# Patient Record
Sex: FEMALE | Race: WHITE | NOT HISPANIC OR LATINO | Employment: OTHER | URBAN - METROPOLITAN AREA
[De-identification: names, ages, dates, MRNs, and addresses within clinical notes are randomized per-mention and may not be internally consistent; named-entity substitution may affect disease eponyms.]

---

## 2023-12-07 ENCOUNTER — OFFICE VISIT (OUTPATIENT)
Dept: ORTHOPEDIC SURGERY | Facility: CLINIC | Age: 78
End: 2023-12-07
Payer: MEDICARE

## 2023-12-07 VITALS — HEIGHT: 62 IN | WEIGHT: 143 LBS | BODY MASS INDEX: 26.31 KG/M2

## 2023-12-07 DIAGNOSIS — M25.562 CHRONIC PAIN OF LEFT KNEE: Primary | ICD-10-CM

## 2023-12-07 DIAGNOSIS — G89.29 CHRONIC PAIN OF LEFT KNEE: Primary | ICD-10-CM

## 2023-12-07 DIAGNOSIS — M17.12 ARTHRITIS OF LEFT KNEE: ICD-10-CM

## 2023-12-07 PROCEDURE — 20610 DRAIN/INJ JOINT/BURSA W/O US: CPT | Performed by: ORTHOPAEDIC SURGERY

## 2023-12-07 PROCEDURE — 99213 OFFICE O/P EST LOW 20 MIN: CPT | Performed by: ORTHOPAEDIC SURGERY

## 2023-12-07 RX ORDER — IBUPROFEN 800 MG/1
800 TABLET ORAL 3 TIMES DAILY
Qty: 90 TABLET | Refills: 3 | Status: SHIPPED | OUTPATIENT
Start: 2023-12-07 | End: 2024-01-06

## 2023-12-07 RX ORDER — LIDOCAINE HYDROCHLORIDE 20 MG/ML
2 INJECTION, SOLUTION INFILTRATION; PERINEURAL
Status: COMPLETED | OUTPATIENT
Start: 2023-12-07 | End: 2023-12-07

## 2023-12-07 RX ORDER — TRIAMCINOLONE ACETONIDE 40 MG/ML
40 INJECTION, SUSPENSION INTRA-ARTICULAR; INTRAMUSCULAR
Status: COMPLETED | OUTPATIENT
Start: 2023-12-07 | End: 2023-12-07

## 2023-12-07 RX ORDER — HYDROXYZINE HYDROCHLORIDE 10 MG/1
10 TABLET, FILM COATED ORAL NIGHTLY
COMMUNITY

## 2023-12-07 RX ADMIN — TRIAMCINOLONE ACETONIDE 40 MG: 40 INJECTION, SUSPENSION INTRA-ARTICULAR; INTRAMUSCULAR at 11:43

## 2023-12-07 RX ADMIN — LIDOCAINE HYDROCHLORIDE 2 ML: 20 INJECTION, SOLUTION INFILTRATION; PERINEURAL at 11:43

## 2023-12-07 ASSESSMENT — ENCOUNTER SYMPTOMS: KNEE DEFORMITY: 1

## 2023-12-07 NOTE — PROGRESS NOTES
Subjective    Patient ID: Vicki Chaney is a 78 y.o. female.    Chief Complaint: Follow-up of the Left Knee     Last Surgery: No surgery found  Last Surgery Date: No surgery found    Left Knee       She comes in for follow-up of her left knee arthritis.  She chooses to treat this conservatively.  She typically gets adequate relief with a Kenalog injection.  Her last injection was well over 4 months ago.  She denies any new trauma.  She denies any fevers or chills.    Objective   Ortho Exam  Patient is in no acute distress.  Exam of her left knee reveals she does have a moderate effusion.  There is no warmth erythema.  There is no evidence of infection.  She is tender to palpation over the medial joint line.  She has no tenderness laterally.  There was mild crepitus with active flexion and extension of her knee.  Active range of motion was 0 to greater than 124 degrees of flexion.  Knee was stable to varus and valgus stress testing.    Assessment/Plan   Encounter Diagnoses:  Chronic pain of left knee    Arthritis of left knee    Patient has left knee pain from known left knee arthritis.  She wished to undergo another Kenalog injection as this provides enough relief.    L Inj/Asp: L knee on 12/7/2023 11:43 AM  Indications: pain  Details: 22 G needle, anteromedial approach  Medications: 40 mg triamcinolone acetonide 40 mg/mL; 2 mL lidocaine 20 mg/mL (2 %)  Outcome: tolerated well, no immediate complications  Procedure, treatment alternatives, risks and benefits explained, specific risks discussed. Consent was given by the patient. Immediately prior to procedure a time out was called to verify the correct patient, procedure, equipment, support staff and site/side marked as required. Patient was prepped and draped in the usual sterile fashion.       Patient was informed it may take a week for the injection have an effect.  She otherwise will follow-up as her symptoms dictate.

## 2024-02-29 ENCOUNTER — APPOINTMENT (OUTPATIENT)
Dept: ORTHOPEDIC SURGERY | Facility: CLINIC | Age: 79
End: 2024-02-29
Payer: MEDICARE

## 2024-03-04 ENCOUNTER — OFFICE VISIT (OUTPATIENT)
Dept: ORTHOPEDIC SURGERY | Facility: CLINIC | Age: 79
End: 2024-03-04
Payer: MEDICARE

## 2024-03-04 ENCOUNTER — HOSPITAL ENCOUNTER (OUTPATIENT)
Dept: RADIOLOGY | Facility: CLINIC | Age: 79
Discharge: HOME | End: 2024-03-04
Payer: MEDICARE

## 2024-03-04 VITALS — HEIGHT: 61 IN | WEIGHT: 144 LBS | BODY MASS INDEX: 27.19 KG/M2

## 2024-03-04 DIAGNOSIS — G89.29 BILATERAL CHRONIC KNEE PAIN: ICD-10-CM

## 2024-03-04 DIAGNOSIS — M25.561 BILATERAL CHRONIC KNEE PAIN: ICD-10-CM

## 2024-03-04 DIAGNOSIS — M25.562 BILATERAL CHRONIC KNEE PAIN: ICD-10-CM

## 2024-03-04 DIAGNOSIS — M17.0 PRIMARY OSTEOARTHRITIS OF BOTH KNEES: Primary | ICD-10-CM

## 2024-03-04 PROCEDURE — 1036F TOBACCO NON-USER: CPT | Performed by: ORTHOPAEDIC SURGERY

## 2024-03-04 PROCEDURE — 73562 X-RAY EXAM OF KNEE 3: CPT | Mod: 50

## 2024-03-04 PROCEDURE — 1159F MED LIST DOCD IN RCRD: CPT | Performed by: ORTHOPAEDIC SURGERY

## 2024-03-04 PROCEDURE — 73562 X-RAY EXAM OF KNEE 3: CPT | Mod: BILATERAL PROCEDURE | Performed by: RADIOLOGY

## 2024-03-04 PROCEDURE — 20610 DRAIN/INJ JOINT/BURSA W/O US: CPT | Performed by: ORTHOPAEDIC SURGERY

## 2024-03-04 PROCEDURE — 99213 OFFICE O/P EST LOW 20 MIN: CPT | Performed by: ORTHOPAEDIC SURGERY

## 2024-03-04 RX ORDER — TRIAMCINOLONE ACETONIDE 40 MG/ML
10 INJECTION, SUSPENSION INTRA-ARTICULAR; INTRAMUSCULAR
Status: COMPLETED | OUTPATIENT
Start: 2024-03-04 | End: 2024-03-04

## 2024-03-04 RX ORDER — SERTRALINE HYDROCHLORIDE 100 MG/1
TABLET, FILM COATED ORAL
COMMUNITY

## 2024-03-04 RX ORDER — LIDOCAINE HYDROCHLORIDE 20 MG/ML
2 INJECTION, SOLUTION INFILTRATION; PERINEURAL
Status: COMPLETED | OUTPATIENT
Start: 2024-03-04 | End: 2024-03-04

## 2024-03-04 RX ADMIN — TRIAMCINOLONE ACETONIDE 10 MG: 40 INJECTION, SUSPENSION INTRA-ARTICULAR; INTRAMUSCULAR at 11:59

## 2024-03-04 RX ADMIN — LIDOCAINE HYDROCHLORIDE 2 ML: 20 INJECTION, SOLUTION INFILTRATION; PERINEURAL at 11:59

## 2024-03-04 ASSESSMENT — ENCOUNTER SYMPTOMS
KNEE DEFORMITY: 1
KNEE SWELLING: 1

## 2024-03-04 NOTE — PROGRESS NOTES
Subjective    Patient ID: Vicki Chaney is a 78 y.o. female.    Chief Complaint: Follow-up of the Left Knee and Follow-up of the Right Knee     Last Surgery: No surgery found  Last Surgery Date: No surgery found    Left Knee     Right Knee       Patient comes in for follow-up of her bilateral knee pain.  He states her left knee is more symptomatic than the right.  She typically treats her arthritic symptoms with Kenalog injections.  Is been over 4 months since an injection.  However it has also been longer than that since a set of x-rays.    Objective   Ortho Exam  Patient is in no acute distress.  She does walk using a cane to assist with ambulation.  Her left knee has moderate swelling.  There is no warmth or erythema.  There is crepitus at the patellofemoral joint with flexion and extension.  She is tender more along the medial joint line and the lateral joint line.  The knee is stable to varus and valgus stress testing.  Range of motion is from 0 to greater than 120 degrees of flexion.    Exam of the patient's right knee reveals her skin envelope is intact.  Her range of motion is from 0 to greater than 120 degrees of flexion.  Knee is stable to varus and valgus stress testing.  There is also crepitus with flexion and extension of the knee.    X-rays of both knees were personally reviewed today.  She has moderate joint space narrowing medially.  In the patellofemoral compartments she has bone-on-bone in both knees.    Assessment/Plan   Encounter Diagnoses:  Primary osteoarthritis of both knees    Bilateral chronic knee pain    Orders Placed This Encounter    XR knee 3 views bilateral     Patient has bilateral knee pain secondary to arthritis in both knees.  We discussed treatment options and she elected to undergo Kenalog injections in both knees today.    L Inj/Asp: bilateral knee on 3/4/2024 11:59 AM  Indications: pain  Details: 22 G needle, anteromedial approach  Medications (Right): 10 mg triamcinolone  acetonide 40 mg/mL; 2 mL lidocaine 20 mg/mL (2 %)  Medications (Left): 10 mg triamcinolone acetonide 40 mg/mL; 2 mL lidocaine 20 mg/mL (2 %)  Outcome: tolerated well, no immediate complications  Procedure, treatment alternatives, risks and benefits explained, specific risks discussed. Consent was given by the patient. Immediately prior to procedure a time out was called to verify the correct patient, procedure, equipment, support staff and site/side marked as required. Patient was prepped and draped in the usual sterile fashion.         Patient was again informed that takes about a week for the Kenalog injections have an effect.  She otherwise will follow-up as her symptoms dictate.

## 2024-11-13 ENCOUNTER — OFFICE VISIT (OUTPATIENT)
Dept: ORTHOPEDIC SURGERY | Facility: CLINIC | Age: 79
End: 2024-11-13
Payer: MEDICARE

## 2024-11-13 VITALS — HEIGHT: 61 IN | WEIGHT: 148 LBS | BODY MASS INDEX: 27.94 KG/M2

## 2024-11-13 DIAGNOSIS — M17.0 PRIMARY OSTEOARTHRITIS OF BOTH KNEES: ICD-10-CM

## 2024-11-13 DIAGNOSIS — M25.561 BILATERAL CHRONIC KNEE PAIN: Primary | ICD-10-CM

## 2024-11-13 DIAGNOSIS — M25.562 BILATERAL CHRONIC KNEE PAIN: Primary | ICD-10-CM

## 2024-11-13 DIAGNOSIS — G89.29 BILATERAL CHRONIC KNEE PAIN: Primary | ICD-10-CM

## 2024-11-13 PROCEDURE — 1160F RVW MEDS BY RX/DR IN RCRD: CPT | Performed by: ORTHOPAEDIC SURGERY

## 2024-11-13 PROCEDURE — 99213 OFFICE O/P EST LOW 20 MIN: CPT | Performed by: ORTHOPAEDIC SURGERY

## 2024-11-13 PROCEDURE — 1159F MED LIST DOCD IN RCRD: CPT | Performed by: ORTHOPAEDIC SURGERY

## 2024-11-13 PROCEDURE — 2500000004 HC RX 250 GENERAL PHARMACY W/ HCPCS (ALT 636 FOR OP/ED): Performed by: ORTHOPAEDIC SURGERY

## 2024-11-13 PROCEDURE — 20610 DRAIN/INJ JOINT/BURSA W/O US: CPT | Mod: 50 | Performed by: ORTHOPAEDIC SURGERY

## 2024-11-13 PROCEDURE — 1036F TOBACCO NON-USER: CPT | Performed by: ORTHOPAEDIC SURGERY

## 2024-11-13 RX ORDER — TRIAMCINOLONE ACETONIDE 40 MG/ML
40 INJECTION, SUSPENSION INTRA-ARTICULAR; INTRAMUSCULAR
Status: COMPLETED | OUTPATIENT
Start: 2024-11-13 | End: 2024-11-13

## 2024-11-13 RX ORDER — LIDOCAINE HYDROCHLORIDE 20 MG/ML
2 INJECTION, SOLUTION INFILTRATION; PERINEURAL
Status: COMPLETED | OUTPATIENT
Start: 2024-11-13 | End: 2024-11-13

## 2024-11-13 ASSESSMENT — ENCOUNTER SYMPTOMS
KNEE SWELLING: 1
KNEE DEFORMITY: 1

## 2024-11-13 NOTE — PROGRESS NOTES
Subjective    Patient ID: Vicki Chaney is a 79 y.o. female.    Chief Complaint: Follow-up of the Left Knee and Follow-up of the Right Knee     Last Surgery: No surgery found  Last Surgery Date: No surgery found    Left Knee     Right Knee       Patient returns today for follow-up of her bilateral knee arthritis.  She has chosen to treat this conservatively.  Her last set of injections was about 8 months ago.  She states her right side is more painful than her left.  She does use a cane to assist with ambulation not just because of the knee pain but because of balance problems.    Objective   Ortho Exam  The patient is in no acute distress.  She is using a cane in her right hand to assist with ambulation.  Exam of her left knee reveals she has a moderate effusion.  There is no warmth erythema.  There was crepitus with flexion and extension of the knee.  She has tenderness more so over the medial joint line.  Active range of motion from 0 to greater than 120 degrees of flexion.    Exam of the patient's right knee reveals there is just a mild effusion.  There is no warmth erythema.  There is crepitus with flexion and extension of the knee.  The knee is stable to varus and valgus stress testing.  She is tender along the medial joint line.  Range of motion is from 0 to greater than 120 degrees of flexion.  Image Results:  XR knee 3 views bilateral  Narrative: Interpreted By:  Geovanna Garcia,   STUDY:  Bilateral knees, three views each      INDICATION:  Signs/Symptoms:BILATERAL KNEE PAIN.      COMPARISON:  None.      ACCESSION NUMBER(S):  [id number]      ORDERING CLINICIAN:  FLORIN DELA CRUZ      FINDINGS:  No acute fracture or malalignment of the bilateral knees.  Moderate bilateral patellofemoral compartment osteoarthrosis with  joint space loss and osteophytes. Mild bilateral medial compartment  osteoarthrosis as well. No significant joint effusion bilaterally.  Bilateral femoral artery vascular calcifications.       Impression: 1. Moderate patellofemoral and mild medial compartment osteoarthrosis  of the bilateral knees.      MACRO:  None.      Signed by: Geovanna Garcia 3/5/2024 6:05 PM  Dictation workstation:   [id number]      Assessment/Plan   Encounter Diagnoses:  Bilateral chronic knee pain    Primary osteoarthritis of both knees    Patient has known bilateral knee arthritis.  She elected again to treat these conservatively with Kenalog injections in both knees.    L Inj/Asp: bilateral knee on 11/13/2024 1:18 PM  Indications: pain  Details: 22 G needle, anteromedial approach  Medications (Right): 40 mg triamcinolone acetonide 40 mg/mL; 2 mL lidocaine 20 mg/mL (2 %)  Medications (Left): 40 mg triamcinolone acetonide 40 mg/mL; 2 mL lidocaine 20 mg/mL (2 %)  Outcome: tolerated well, no immediate complications  Procedure, treatment alternatives, risks and benefits explained, specific risks discussed. Consent was given by the patient. Immediately prior to procedure a time out was called to verify the correct patient, procedure, equipment, support staff and site/side marked as required. Patient was prepped and draped in the usual sterile fashion.       Patient was informed that we will take approximately a week for the injections have an effect.  She would use the assistive device as needed.  She will follow-up as her symptoms dictate.

## 2025-04-03 ENCOUNTER — APPOINTMENT (OUTPATIENT)
Dept: ORTHOPEDIC SURGERY | Facility: CLINIC | Age: 80
End: 2025-04-03
Payer: MEDICARE

## 2025-04-03 DIAGNOSIS — M25.562 BILATERAL CHRONIC KNEE PAIN: Primary | ICD-10-CM

## 2025-04-03 DIAGNOSIS — G89.29 BILATERAL CHRONIC KNEE PAIN: Primary | ICD-10-CM

## 2025-04-03 DIAGNOSIS — M25.561 BILATERAL CHRONIC KNEE PAIN: Primary | ICD-10-CM

## 2025-04-03 DIAGNOSIS — M17.0 PRIMARY OSTEOARTHRITIS OF BOTH KNEES: ICD-10-CM

## 2025-04-03 PROCEDURE — 1159F MED LIST DOCD IN RCRD: CPT | Performed by: ORTHOPAEDIC SURGERY

## 2025-04-03 PROCEDURE — 20610 DRAIN/INJ JOINT/BURSA W/O US: CPT | Performed by: ORTHOPAEDIC SURGERY

## 2025-04-03 PROCEDURE — 99213 OFFICE O/P EST LOW 20 MIN: CPT | Performed by: ORTHOPAEDIC SURGERY

## 2025-04-03 PROCEDURE — 1036F TOBACCO NON-USER: CPT | Performed by: ORTHOPAEDIC SURGERY

## 2025-04-03 RX ORDER — TRIAMCINOLONE ACETONIDE 40 MG/ML
40 INJECTION, SUSPENSION INTRA-ARTICULAR; INTRAMUSCULAR
Status: COMPLETED | OUTPATIENT
Start: 2025-04-03 | End: 2025-04-03

## 2025-04-03 RX ORDER — LIDOCAINE HYDROCHLORIDE 20 MG/ML
2 INJECTION, SOLUTION INFILTRATION; PERINEURAL
Status: COMPLETED | OUTPATIENT
Start: 2025-04-03 | End: 2025-04-03

## 2025-04-03 RX ORDER — IBUPROFEN 800 MG/1
800 TABLET ORAL 3 TIMES DAILY
Qty: 90 TABLET | Refills: 5 | Status: SHIPPED | OUTPATIENT
Start: 2025-04-03 | End: 2025-09-30

## 2025-04-03 RX ADMIN — TRIAMCINOLONE ACETONIDE 40 MG: 40 INJECTION, SUSPENSION INTRA-ARTICULAR; INTRAMUSCULAR at 11:58

## 2025-04-03 RX ADMIN — LIDOCAINE HYDROCHLORIDE 2 ML: 20 INJECTION, SOLUTION INFILTRATION; PERINEURAL at 11:58

## 2025-04-03 ASSESSMENT — ENCOUNTER SYMPTOMS
KNEE DEFORMITY: 1
KNEE SWELLING: 1

## 2025-04-03 NOTE — PROGRESS NOTES
Subjective    Patient ID: Vicki Chaney is a 79 y.o. female.    Chief Complaint: Follow-up of the Left Knee (FUV BL KNEE INJ LAST DONE 11/13/24) and Follow-up of the Right Knee     Last Surgery: No surgery found  Last Surgery Date: No surgery found    Left Knee     Right Knee       The patient returns today for follow-up of her bilateral knee arthritis.  She gets adequate relief with Kenalog injections.  Her last set of injections were nearly 5 months ago.  Today her left side is slightly more symptomatic than the right side.  She denies any new trauma.    Objective   Ortho Exam  The patient is in no acute distress.  Exam of her left knee reveals she has a moderate effusion.  There is no warmth erythema.  She is tender over the medial joint line as opposed to the lateral joint line.  There was no crepitus with flexion or extension of the knee.  Active range of motion was from 0 to greater than 120 degrees of flexion.  The knee was stable to varus and valgus stress testing.    Exam of the patient's right knee reveals there is a mild effusion.  There is no warmth erythema.  She was mildly tender over the medial joint line.  The knee was able to varus and valgus stress testing.  Active range of motion was from 0 to greater than 120 degrees of flexion.      Image Results:  XR knee 3 views bilateral  Narrative: Interpreted By:  Geovanna Garcia,   STUDY:  Bilateral knees, three views each      INDICATION:  Signs/Symptoms:BILATERAL KNEE PAIN.      COMPARISON:  None.      ACCESSION NUMBER(S):  [id number]      ORDERING CLINICIAN:  FLORIN DELA CRUZ      FINDINGS:  No acute fracture or malalignment of the bilateral knees.  Moderate bilateral patellofemoral compartment osteoarthrosis with  joint space loss and osteophytes. Mild bilateral medial compartment  osteoarthrosis as well. No significant joint effusion bilaterally.  Bilateral femoral artery vascular calcifications.      Impression: 1. Moderate patellofemoral and  mild medial compartment osteoarthrosis  of the bilateral knees.      MACRO:  None.      Signed by: Geovanna Garcia 3/5/2024 6:05 PM  Dictation workstation:   [id number]      Assessment/Plan   Encounter Diagnoses:  Bilateral chronic knee pain    Primary osteoarthritis of both knees    Orders Placed This Encounter    ibuprofen 800 mg tablet     Patient has known bilateral knee arthritis.  She wishes to undergo another set of Kenalog injections today in the office.    L Inj/Asp: bilateral knee on 4/3/2025 11:58 AM  Indications: pain and joint swelling  Details: 22 G needle, anteromedial approach  Medications (Right): 40 mg triamcinolone acetonide 40 mg/mL; 2 mL lidocaine 20 mg/mL (2 %)  Medications (Left): 40 mg triamcinolone acetonide 40 mg/mL; 2 mL lidocaine 20 mg/mL (2 %)  Procedure, treatment alternatives, risks and benefits explained, specific risks discussed. Consent was given by the patient. Immediately prior to procedure a time out was called to verify the correct patient, procedure, equipment, support staff and site/side marked as required. Patient was prepped and draped in the usual sterile fashion.         Patient was informed it takes about a week for the injection to have an effect.  She otherwise will follow-up as her symptoms dictate.

## 2025-04-29 ENCOUNTER — HOSPITAL ENCOUNTER (OUTPATIENT)
Dept: RADIOLOGY | Facility: CLINIC | Age: 80
Discharge: HOME | End: 2025-04-29
Payer: MEDICARE

## 2025-04-29 ENCOUNTER — APPOINTMENT (OUTPATIENT)
Dept: ORTHOPEDIC SURGERY | Facility: CLINIC | Age: 80
End: 2025-04-29
Payer: MEDICARE

## 2025-04-29 DIAGNOSIS — M47.812 CERVICAL SPONDYLOSIS: ICD-10-CM

## 2025-04-29 DIAGNOSIS — M47.812 CERVICAL SPONDYLOSIS: Primary | ICD-10-CM

## 2025-04-29 DIAGNOSIS — M54.12 CERVICAL RADICULOPATHY: ICD-10-CM

## 2025-04-29 DIAGNOSIS — Q76.415 CONGENITAL KYPHOSIS OF THORACOLUMBAR REGION: ICD-10-CM

## 2025-04-29 PROCEDURE — 1160F RVW MEDS BY RX/DR IN RCRD: CPT | Performed by: STUDENT IN AN ORGANIZED HEALTH CARE EDUCATION/TRAINING PROGRAM

## 2025-04-29 PROCEDURE — 1159F MED LIST DOCD IN RCRD: CPT | Performed by: STUDENT IN AN ORGANIZED HEALTH CARE EDUCATION/TRAINING PROGRAM

## 2025-04-29 PROCEDURE — 99204 OFFICE O/P NEW MOD 45 MIN: CPT | Performed by: STUDENT IN AN ORGANIZED HEALTH CARE EDUCATION/TRAINING PROGRAM

## 2025-04-29 PROCEDURE — 72050 X-RAY EXAM NECK SPINE 4/5VWS: CPT | Performed by: RADIOLOGY

## 2025-04-29 PROCEDURE — 72050 X-RAY EXAM NECK SPINE 4/5VWS: CPT

## 2025-04-29 SDOH — SOCIAL STABILITY: SOCIAL NETWORK: SOCIAL ACTIVITY:: 10

## 2025-04-29 NOTE — PROGRESS NOTES
New Consult/New Patient Note    4/29/2025   No ref. provider found    Assessment:      PLAN:  1)  Imaging/Diagnostic Studies: [ ]   2)  Therapy/Rehabilitation: [ ]   3)  Pharmacological Management: Can continue ibuprofen as prescribed, and takes Tylenol 650mg.   4)  Spine/Surgical Interventions: [ ]   5)  Alternative Treatments: May consider alternative treatment options in the future including manipulation (chiropractor versus osteopathic) and/or acupuncture if patient does not obtain optimal relief with initial treatment plan.  6)  Consultations:  None at this time  7)  Follow -up: 4-6 weeks or PRN if symptoms worsen/do not improve.   8)  Future treatment considerations: [ ]     Patient advised of the difference between hurt and harm and advised to continue with all normal activities and exercises. Patient verbalized understanding of the above plan and was happy with the care provided.      The above clinical summary has been dictated with voice recognition software. It has not been proofread for grammatical errors, typographical mistakes, or other semantic inconsistencies.    Thank you for visiting our office today. It was our pleasure to take part in your healthcare.     Do not hesitate to call with any questions regarding your plan of care after leaving at [phone]    To clinicians, thank you very much for this kind referral. It is a privilege to partner with you in the care of your patients. My office would be delighted to assist you with any further consultations or with questions regarding the plan of care outlined. Do not hesitate to call the office or contact me directly.     Sincerely,    Azul Ramirez MD  Fellow [facility], PM&R  [facility] [employer]  [facility] [place]    **Neck pain dull and throbbing to left side  *cervical pain  Patient angeline  **Note Jessica 2023      ***Home exercises   PT, XR     Pain with extension and looking to left cervical  spine.         Patient reports that she was doing physical therapy in 2023 for gait imbalance. She ambulates with a cane. Patient describes radiating pain, she states a tingling near her left scapula. Patient reports this changes with position.       Vicki Chaney   is a 79 y.o. female who presents with   Location: Left neck pain that goes down to the scapula  Radiation: to left scapulothoracic area  Quality: sharp and dull; pulsating      current 4-5/10,  at its worst 10 /10  Exacerbated by certain positions in sitting   Relieved by changing body positions   Onset, traumatic event:  no  Has tried:  aspercreme, icyhot, salonpas    Valsalva sign is no  Grocery cart sign is no  Smoker:  no  Does not wake them at night    Patient denies bowel/bladder incontinence, denies fever, denies unintentional weight loss, denies clumsiness of hands, feet, or dropping things.  Denies any constitutional or myelopathic symptomatology.      PREVIOUS TREATMENTS  IN THE LAST SIX MONTHS     Active conservative therapy  in the last six months (see below)              1. Physical therapy:   no                                                                                  2. Home exercise program after PT:                                                      3. A physician supervised home exercise program (HEP):                 4. Chiropractic Care:    no                                                                  Passive conservative therapy  in the last six months (see below)              1. NSAIDS: Ibuprofen                                                                                                        2. Prescription pain medication:                                                              3. Acupuncture:                                                                                             4. Tens unit:      Assistive Devices:     Work status: retired       ROS: Other than listed in HPI, PMHX below, and  intake paperwork including a 30 point patient-recorded review of symptoms which was personally reviewed and inclusive of no history of unintentional weight loss, change in appetite, significant malaise, fevers, chills, or change in bowel/bladder, shortness of breath, or chest pain.    I have confirmed and edited as necessary Past Medical, Past Surgical, Family, Social History and ROS as obtained by others. These were also obtained on new patient forms.      PHYSICAL EXAM:   GENERAL APPEARANCE:  Well nourished, well developed, and no apparent distress.  NEURO PSYCH: Patient oriented to person, place, Mood pleasant. Benign affect.  MUSCULOSKELETAL and NEUROLOGICAL       VISUAL INSPECTION          CERVICAL: WNL          THORACIC: WNL           LUMBAR: WNL  SPINE ROM:   CERVICAL ROM: [ ]   LUMBAR ROM: [ ]       PALPATION:           SPINOUS PROCESS: [ ]            PARASPINALS: [ ]   FACET LOADING: [ ]   MUSCLE BULK: Normal and symmetrical in the upper & lower extremities.  MUSCLE TONE: Normal  MOTOR: 5/5 in all muscle groups tested in bilateral upper and lower extremities   SENSORY: Normal sensory exam to light touch  GAIT: Normal.  Able to go up and heels and toes with no sig. weakness.  No sig. balance deficit appreciated  REFLEXES: +2 to bilateral U/L extremities  LONG TRACT SIGNS: No clonus, Neg Hoffmans.  STRAIGHT LEG TEST: [ ]   PERIPHERAL JOINT ROM:   HIP ROM: Full bilaterally  PANCHITO/Thigh Thrust/Compression/Juliana Finger:  Negative bilaterally   Hip Exam including thigh thrust and LOG ROLL: Negative bilaterally  SHOULDER ROM: Full bilaterally   SPURLING'S TEST: Negative  BAKODY'S SIGN:  No sig. pain with overhead activity    DATA REVIEW:   The below imaging studies were personally reviewed and discussed with the patient.    Medical Decision Making:  The above note constitutes a Moderate to High level of medical decision making based on past data and imaging review, new and chronic symptoms with exacerbation,  change in weakness or sensation, new imaging and diagnostic studies ordered, discussion of potential interventional or surgical treatment options, acute or chronic pain that may pose a threat to bodily function.    Medical History[1]    Medication Documentation Review Audit       Reviewed by Sonal Baptiste RN (Registered Nurse) on 04/29/25 at 1343      Medication Order Taking? Sig Documenting Provider Last Dose Status   hydrOXYzine HCL (Atarax) 10 mg tablet [id number] No Take 1 tablet (10 mg) by mouth once daily at bedtime. Historical Provider, MD Taking Active   ibuprofen 800 mg tablet [id number]  Take 1 tablet (800 mg) by mouth 3 times a day. João Bynum MD  Active   sertraline (Zoloft) 100 mg tablet [id number] No take 1 TABLET or 1 and ONE-HALF TABLET BY MOUTH IN THE MORNING Historical Provider, MD Taking Active                    RX Allergies[2]    Social History     Socioeconomic History    Marital status:      Spouse name: Not on file    Number of children: Not on file    Years of education: Not on file    Highest education level: Not on file   Occupational History    Not on file   Tobacco Use    Smoking status: Never    Smokeless tobacco: Never   Substance and Sexual Activity    Alcohol use: Yes    Drug use: Never    Sexual activity: Not on file   Other Topics Concern    Not on file   Social History Narrative    Not on file     Social Drivers of Health     Financial Resource Strain: Not on file   Food Insecurity: Not on file   Transportation Needs: Not on file   Physical Activity: Not on file   Stress: Not on file   Social Connections: Not on file   Intimate Partner Violence: Not on file   Housing Stability: Not on file       Surgical History[3]         [1]   Past Medical History:  Diagnosis Date    Anxiety disorder, unspecified     Anxiety and depression    Personal history of other diseases of the musculoskeletal system and connective tissue     History of arthritis   [2]   Allergies  Allergen Reactions     Ciprofloxacin Unknown    Diazepam Unknown    Erythromycin Unknown    Hydrocodone-Acetaminophen Unknown     Other reaction(s): Mental Status Change    Oxycodone Unknown    Oxycodone-Acetaminophen Confusion and Unknown    Prednisone Hallucinations    Pregabalin Unknown     Other reaction(s): Intolerance   diarrhea, dizziness, and sensation of feeling hot per 2008 and 2009 pain management notes. (Dr White)   [3]   Past Surgical History:  Procedure Laterality Date    OTHER SURGICAL HISTORY  09/01/2022    Carpal tunnel surgery    OTHER SURGICAL HISTORY  05/09/2022    Tubal ligation

## 2025-05-21 ENCOUNTER — EVALUATION (OUTPATIENT)
Dept: PHYSICAL THERAPY | Facility: CLINIC | Age: 80
End: 2025-05-21
Payer: MEDICARE

## 2025-05-21 DIAGNOSIS — M47.812 CERVICAL SPONDYLOSIS: ICD-10-CM

## 2025-05-21 DIAGNOSIS — M54.12 CERVICAL RADICULOPATHY: ICD-10-CM

## 2025-05-21 PROCEDURE — 97161 PT EVAL LOW COMPLEX 20 MIN: CPT | Mod: GP

## 2025-05-21 PROCEDURE — 97110 THERAPEUTIC EXERCISES: CPT | Mod: GP

## 2025-05-21 ASSESSMENT — BALANCE ASSESSMENTS
STANDING ON ONE LEG: TRIES TO LIFT LEG UNABLE TO HOLD 3 SECONDS BUT REMAINS STANDING INDEPENDENTLY
STANDING TO SITTING: SITS SAFELY WITH MINIMAL USE OF HANDS
SITTING WITH BACK UNSUPPORTED BUT FEET SUPPORTED ON FLOOR OR ON A STOOL: ABLE TO SIT SAFELY AND SECURELY FOR 2 MINUTES
LONG VERSION TOTAL SCORE (MAX 56): 38
PICK UP OBJECT FROM THE FLOOR FROM A STANDING POSITION: ABLE TO PICK UP SLIPPER SAFELY AND EASILY
PLACE ALTERNATE FOOT ON STEP OR STOOL WHILE STANDING UNSUPPORTED: NEEDS ASSISTANCE TO KEEP FROM FALLING/UNABLE TO TRY
STANDING TO SITTING: ABLE TO STAND INDEPENDENTLY USING HANDS
TURN 360 DEGREES: ABLE TO TURN 360 DEGREES SAFELY BUT SLOWLY
STANDING UNSUPPORTED ONE FOOT IN FRONT: LOSES BALANCE WHILE STEPPING OR STANDING
STANDING UNSUPPORTED WITH EYES CLOSED: ABLE TO STAND 10 SECONDS SAFELY
REACHING FORWARD WITH OUTSTRETCHED ARM WHILE STANDING: CAN REACH FORWARD 12 CM (5 INCHES)
STANDING UNSUPPORTED: ABLE TO STAND SAFELY FOR 2 MINUTES
TRANSFERS: ABLE TO TRANSFER SAFELY DEFINITE NEED OF HANDS
PLACE ALTERNATE FOOT ON STEP OR STOOL WHILE STANDING UNSUPPORTED: TURNS SIDEWAYS ONLY BUT MAINTAINS BALANCE
STANDING UNSUPPORTED WITH FEET TOGETHER: ABLE TO PLACE FEET TOGETHER INDEPENDENTLY AND STAND 1 MINUTE SAFELY

## 2025-05-21 ASSESSMENT — ENCOUNTER SYMPTOMS
DEPRESSION: 0
OCCASIONAL FEELINGS OF UNSTEADINESS: 1
LOSS OF SENSATION IN FEET: 0

## 2025-05-21 ASSESSMENT — PATIENT HEALTH QUESTIONNAIRE - PHQ9
2. FEELING DOWN, DEPRESSED OR HOPELESS: NOT AT ALL
1. LITTLE INTEREST OR PLEASURE IN DOING THINGS: NOT AT ALL
SUM OF ALL RESPONSES TO PHQ9 QUESTIONS 1 AND 2: 0

## 2025-05-21 NOTE — PROGRESS NOTES
Physical Therapy Evaluation    Patient Name Vicki Chaney  MRN: [id number]  Today's Date: 5/21/2025  Time Calculation  Start Time: 1538  Stop Time: 1623  Time Calculation (min): 45 min      Assessment:  The pt presents with pain and limited mobility in the c-spine / L UE region.  Chronic with poor postural and body mechanics awareness.  The pt likes to read and to puzzles and would benefit from postural education with these activities.  The pt has decreased ROM, flexibility, and strength.    The pt has balance limitations that increase fall risk.   The pt has decreased tolerance for functional activities.  The pt is an excellent candidate for skilled therapy to address the above listed limitations and to improve tolerance for functional activities including reading/ puzzles.     Stable and/or uncomplicated characteristics    Level of complexity:  low    Impression:  Skilled PT services will aid in advancing functional status and attaining therapy goals.    Problem List:  -activity limitations  -Functional limitations  -Pain c/t-spine, L UE  -decreased  ROM  -decreased strength   -decreased flexibility  -posture awareness/ body mechanics  -decreased knowledge of HEP  -balance  -gait limitations    Goals:  STG: Pt (I) with initial HEP; By week 3; Goal     LTG Goals: 5/21/25  By week: 6-12    Pain: Decrease neck / L UE pain to 1/10 or < ; Goal     Posture: pt to demonstrate postural and body mechanics awareness, especially with reading ; Goal     ROM: Increase c-spine  ROM to WFL without limitation from pain. ;  Goal     Strength: Increase scapular and UE strength to 4+/5 or > grossly for improved tolerance for functional activities. ; Goal     Flexibility: Improve flexibility of c-spine/UE to WFL ; Goal     Palpation: Decrease pain with palpation by 50% or > ; Goal    Transfers: STS x 5 = 17 seconds or < ; Goal    Balance: Campbell = 44/56 or > ; Goal    HEP: Pt to be (I) with HEP  ; Goal     Outcome Measurement: NDI  "= 4/50 or < ; Goal     Rehab Potential: excellent    Plan:  Evaluation, Re-evaluation, Therapeutic Exercise, Therapeutic Activity; Neuromuscular reeducation; Postural Education; Body Mechanics; Home Exercise Program; Manual Techniques; Cold Pack; E-Stim; Gait/ Balance/ Transfer Training    1-2 x week  until goals met or maximum rehab potential met    Plan of care was designed with input and agreement by the patient        Therapy Diagnoses:   Problem List Items Addressed This Visit           ICD-10-CM    Cervical spondylosis M47.812    Relevant Orders    Follow Up In Physical Therapy    Cervical radiculopathy M54.12    Relevant Orders    Follow Up In Physical Therapy       General:  Reason for visit: c-spine and t-spine pain, L shoulder pain   Referred by: Dr Azul Park MD appt:  unknown  Preferred Name:  Vicki  Script:  eval and treat  Onset Date:  5/21/22    Insurance:  - Medicare $0 applied  -Certification dates: 5/21/25 to 8/19/25      Subjective:    Precautions:     Current Medical management:     PMHx: neuropathy B feet, Osteoporosis, OA, Scoliosis from childhood, knee and elbow OA     Medications for pain: 800 mg ibuprofen     Diagnostic Tests: x-rays  IMPRESSION:  Degenerative changes and reversal normal cervical lordosis.    Fall risk: Steadi = 9, moderate fall risk    Current Episode of Functional Impairment and/or Pain : Insidious onset of c-spine and L shoulder region pain that worsened 3 years ago and continues to increase.         Pain       Pain assessment 0-10  Pain score: 4  Pain location: central lower c and upper t spine and L shoulder    Exacerbating Factors: rotation L > R, reaching with L arm, some tingling in the L arm intermittently  Relieving Factors: heat patches, 800 mg of ibuprofen \"at least once a day\"    Functional Limitations:  Functional Limitations: Reaching and Lifting    Home Living Situation: Lives with spouse    Prior Level of Function: Has been ambulating with a st " cane, but also has a WW.    Patient Goal For Therapy: To decrease pain and increase tolerance for normal activities.     Occupation: Retired    Hobbies: crossword puzzles.    Objective:    Pain:            Cervical and L shoulder 4/10     Posture:           Min forward head, rounded shoulders decreased postural awareness    ROM:           AROM           Cervical rotation R 62 , L 44                        SB R 18 , L 5                        Extension WNL min pain                        Flexion WNL            Strength:           Cervical   flexion n/a                           Extension n/a                           SB R n/a , L n/a           Shoulder n/a time constraint    Flexibility:           Upper trap R/L limited in mobility with SB to the L/ min-mod restriction          Pec R/L mod / mod          Levator Scapula R/L mod / mod          Scalenes R/L n/a / n/a      Special tests:            Quadrant: (-)          Compression n/a          Distraction n/a    Transfers:           Sit to stand: STS x 5 with 1 armrest = 22 seconds    Balance: Campbell = 38/56    Palpation:           MIld tenderness with palpation of the L upper trap/ scalenes/ levator scap       Ortho:  Outcome Measures:  Campbell Balance Scale  1. Sitting to Standing: Able to stand independently using hands  2. Standing Unsupported: Able to stand safely for 2 minutes  3. Sitting with Back Unsupported but Feet Supported on Floor or on a Stool: Able to sit safely and securely for 2 minutes  4. Standing to Sitting: Sits safely with minimal use of hands  5.  Transfers: Able to transfer safely definite need of hands  6. Standing Unsupported with Eyes Closed: Able to stand 10 seconds safely  7. Standing Unsupported with Feet Together: Able to place feet together independently and stand 1 minute safely  8. Reach Forward with Outstretched Arm While Standing: Can reach forward 12 cm (5 inches)  9.  Object from Floor from a Standing Position: Able to   "slipper safely and easily  10. Turning to Look Behind Over Left and Right Shoulders While Standing: Turns sideways only but maintains balance  11. Turn 360 Degrees: Able to turn 360 degrees safely but slowly  12. Place Alternate Foot on Step or Stool While Standing Unsupported: Needs assistance to keep from falling/unable to try  13. Standing Unsupported One Foot in Front: Loses balance while stepping or standing  14. Standing on One Leg: Tries to lift leg unable to hold 3 seconds but remains standing independently  Campbell Balance Score: 38    Other Measures  Neck Disability Index: 9/50     Treatment:    Evaluation:  Evaluation Complexity: Low: 35 minutes; Moderate   minutes; Complex PT Evaluation Time Entry: 35;  minutes    **= HEP  NV= Next visit  np= not performed  nb= non-billable  G= group HEP= discharged to HEP    Therapeutic Exercise: 10  Seated scapular retraction: 5\" x 10 **  AROM c-spine rotation: x 10 each **  Seated gentle levator scap stretch: L only 15\" x 3 **    NMR:         Education: Pt educated on dx, POC, anatomy, posture, ther ex technique and HEP  Preferred learning:  pictures, demonstration.  Demonstrated good understanding.      Access Code: [email or web address]  URL: [email or web address]  Date: 05/21/2025  Prepared by: Elvia Murphy    Exercises  - Seated Scapular Retraction  - 2-3 x daily - 7 x weekly - 1-2 sets - 10 reps - 5 hold  - Seated Cervical Rotation AROM  - 2-3 x daily - 7 x weekly - 1-2 sets - 10 reps - 3 hold  - Gentle Levator Scapulae Stretch  - 1-2 x daily - 7 x weekly - 2-3 sets - 20 hold  "

## 2025-05-21 NOTE — LETTER
May 21, 2025    [place]  [address] [facility]  [facility] [address]    Patient: Vicki Chaney   YOB: 1945   Date of Visit: 5/21/2025       Dear Azul Ramirez MD  [address]  [address]  [place] [address]    The attached plan of care is being sent to you because your patient’s medical reimbursement requires that you certify the plan of care. Your signature is required to allow uninterrupted insurance coverage.      You may indicate your approval by signing below and faxing this form back to us at Dept Fax: [phone].    Please call Dept: [phone] with any questions or concerns.    Thank you for this referral,        Elvia Broderick, PT  [facility] [address] [facility]  [address] [facility] [address]    Payer: Payor: MEDICARE / Plan: MEDICARE PART A AND B / Product Type: *No Product type* /                                                                         Date:     Dear Elvia Broderick, PT,     Re: Ms. Vicki Chaney, MRN:[id number]    I certify that I have reviewed the attached plan of care and it is medically necessary for Ms. Vicki Chaney (1945) who is under my care.          ______________________________________                    _________________  Provider name and credentials                                           Date and time                                                                                           Plan of Care 5/21/25   Effective from: 5/21/2025  Effective to: 8/19/2025    Plan ID: [id number]            Participants as of Finalize on 5/21/2025    Name Type Comments Contact Info    Azul Ramirez MD Referring Provider  [phone]    Elvia Broderick, PT Physical Therapist  [phone]       Last Plan Note     Author: Elvia Broderick, PT Status: Sign when Signing Visit Last edited: 5/21/2025  3:30 PM        Physical Therapy Evaluation    Patient Name Vicki Chaney  MRN:  [id number]  Today's Date: 5/21/2025  Time Calculation  Start Time: 1538  Stop Time: 1623  Time Calculation (min): 45 min      Assessment:  The pt presents with pain and limited mobility in the c-spine / L UE region.  Chronic with poor postural and body mechanics awareness.  The pt likes to read and to puzzles and would benefit from postural education with these activities.  The pt has decreased ROM, flexibility, and strength.    The pt has balance limitations that increase fall risk.   The pt has decreased tolerance for functional activities.  The pt is an excellent candidate for skilled therapy to address the above listed limitations and to improve tolerance for functional activities including reading/ puzzles.     Stable and/or uncomplicated characteristics    Level of complexity:  low    Impression:  Skilled PT services will aid in advancing functional status and attaining therapy goals.    Problem List:  -activity limitations  -Functional limitations  -Pain c/t-spine, L UE  -decreased  ROM  -decreased strength   -decreased flexibility  -posture awareness/ body mechanics  -decreased knowledge of HEP  -balance  -gait limitations    Goals:  STG: Pt (I) with initial HEP; By week 3; Goal     LTG Goals: 5/21/25  By week: 6-12    Pain: Decrease neck / L UE pain to 1/10 or < ; Goal     Posture: pt to demonstrate postural and body mechanics awareness, especially with reading ; Goal     ROM: Increase c-spine  ROM to WFL without limitation from pain. ;  Goal     Strength: Increase scapular and UE strength to 4+/5 or > grossly for improved tolerance for functional activities. ; Goal     Flexibility: Improve flexibility of c-spine/UE to WFL ; Goal     Palpation: Decrease pain with palpation by 50% or > ; Goal    Transfers: STS x 5 = 17 seconds or < ; Goal    Balance: Campbell = 44/56 or > ; Goal    HEP: Pt to be (I) with HEP  ; Goal     Outcome Measurement: NDI = 4/50 or < ; Goal     Rehab Potential:  "excellent    Plan:  Evaluation, Re-evaluation, Therapeutic Exercise, Therapeutic Activity; Neuromuscular reeducation; Postural Education; Body Mechanics; Home Exercise Program; Manual Techniques; Cold Pack; E-Stim; Gait/ Balance/ Transfer Training    1-2 x week  until goals met or maximum rehab potential met    Plan of care was designed with input and agreement by the patient        Therapy Diagnoses:   Problem List Items Addressed This Visit           ICD-10-CM    Cervical spondylosis M47.812    Relevant Orders    Follow Up In Physical Therapy    Cervical radiculopathy M54.12    Relevant Orders    Follow Up In Physical Therapy       General:  Reason for visit: c-spine and t-spine pain, L shoulder pain   Referred by: Dr Azul Park MD appt:  unknown  Preferred Name:  Vicki  Script:  eval and treat  Onset Date:  5/21/22    Insurance:  - Medicare $0 applied  -Certification dates: 5/21/25 to 8/19/25      Subjective:    Precautions:     Current Medical management:     PMHx: neuropathy B feet, Osteoporosis, OA, Scoliosis from childhood, knee and elbow OA     Medications for pain: 800 mg ibuprofen     Diagnostic Tests: x-rays  IMPRESSION:  Degenerative changes and reversal normal cervical lordosis.    Fall risk: Steadi = 9, moderate fall risk    Current Episode of Functional Impairment and/or Pain : Insidious onset of c-spine and L shoulder region pain that worsened 3 years ago and continues to increase.         Pain       Pain assessment 0-10  Pain score: 4  Pain location: central lower c and upper t spine and L shoulder    Exacerbating Factors: rotation L > R, reaching with L arm, some tingling in the L arm intermittently  Relieving Factors: heat patches, 800 mg of ibuprofen \"at least once a day\"    Functional Limitations:  Functional Limitations: Reaching and Lifting    Home Living Situation: Lives with spouse    Prior Level of Function: Has been ambulating with a st cane, but also has a WW.    Patient Goal " For Therapy: To decrease pain and increase tolerance for normal activities.     Occupation: Retired    Hobbies: crossword puzzles.    Objective:    Pain:            Cervical and L shoulder 4/10     Posture:           Min forward head, rounded shoulders decreased postural awareness    ROM:           AROM           Cervical rotation R 62 , L 44                        SB R 18 , L 5                        Extension WNL min pain                        Flexion WNL            Strength:           Cervical   flexion n/a                           Extension n/a                           SB R n/a , L n/a           Shoulder n/a time constraint    Flexibility:           Upper trap R/L limited in mobility with SB to the L/ min-mod restriction          Pec R/L mod / mod          Levator Scapula R/L mod / mod          Scalenes R/L n/a / n/a      Special tests:            Quadrant: (-)          Compression n/a          Distraction n/a    Transfers:           Sit to stand: STS x 5 with 1 armrest = 22 seconds    Balance: Campbell = 38/56    Palpation:           MIld tenderness with palpation of the L upper trap/ scalenes/ levator scap       Ortho:  Outcome Measures:  Campbell Balance Scale  1. Sitting to Standing: Able to stand independently using hands  2. Standing Unsupported: Able to stand safely for 2 minutes  3. Sitting with Back Unsupported but Feet Supported on Floor or on a Stool: Able to sit safely and securely for 2 minutes  4. Standing to Sitting: Sits safely with minimal use of hands  5.  Transfers: Able to transfer safely definite need of hands  6. Standing Unsupported with Eyes Closed: Able to stand 10 seconds safely  7. Standing Unsupported with Feet Together: Able to place feet together independently and stand 1 minute safely  8. Reach Forward with Outstretched Arm While Standing: Can reach forward 12 cm (5 inches)  9.  Object from Floor from a Standing Position: Able to  slipper safely and easily  10. Turning to  "Look Behind Over Left and Right Shoulders While Standing: Turns sideways only but maintains balance  11. Turn 360 Degrees: Able to turn 360 degrees safely but slowly  12. Place Alternate Foot on Step or Stool While Standing Unsupported: Needs assistance to keep from falling/unable to try  13. Standing Unsupported One Foot in Front: Loses balance while stepping or standing  14. Standing on One Leg: Tries to lift leg unable to hold 3 seconds but remains standing independently  Campbell Balance Score: 38    Other Measures  Neck Disability Index: 9/50     Treatment:    Evaluation:  Evaluation Complexity: Low: 35 minutes; Moderate   minutes; Complex PT Evaluation Time Entry: 35;  minutes    **= HEP  NV= Next visit  np= not performed  nb= non-billable  G= group HEP= discharged to HEP    Therapeutic Exercise: 10  Seated scapular retraction: 5\" x 10 **  AROM c-spine rotation: x 10 each **  Seated gentle levator scap stretch: L only 15\" x 3 **    NMR:         Education: Pt educated on dx, POC, anatomy, posture, ther ex technique and HEP  Preferred learning:  pictures, demonstration.  Demonstrated good understanding.      Access Code: [email or web address]  URL: [email or web address]  Date: 05/21/2025  Prepared by: Elvia Murphy    Exercises  - Seated Scapular Retraction  - 2-3 x daily - 7 x weekly - 1-2 sets - 10 reps - 5 hold  - Seated Cervical Rotation AROM  - 2-3 x daily - 7 x weekly - 1-2 sets - 10 reps - 3 hold  - Gentle Levator Scapulae Stretch  - 1-2 x daily - 7 x weekly - 2-3 sets - 20 hold         Current Participants as of 5/21/2025    Name Type Comments Contact Info    Azul Ramirez MD Referring Provider  [phone]    Signature pending    Elvia Broderick, PT Physical Therapist  [phone]    Electronically signed by Elvia Broderick PT at 5/21/2025 1702 EDT      "

## 2025-06-05 ENCOUNTER — DOCUMENTATION (OUTPATIENT)
Dept: PHYSICAL THERAPY | Facility: CLINIC | Age: 80
End: 2025-06-05
Payer: MEDICARE

## 2025-06-05 NOTE — PROGRESS NOTES
Group Topic:  Education    Date: 7/31/2023  Start Time: 1330  End Time: 1345  Facilitators: Xi Candelaria CNA    Focus: PHP: Patience  Number in attendance: 16    Method: offered but refused     Physical Therapy                 Therapy Communication Note    Patient Name: Vicki Chaney  MRN: [id number]  Department:   Room: Room/bed info not found  Today's Date: 6/5/2025     Discipline: Physical Therapy    Missed Visit:       Missed Visit Reason:  Called and spoke with patient who reported that they had forgot about the appointment due to having another appointment to attend. They only put the other appointment on their calendar.     Missed Time: No Show    Comment:

## 2025-06-10 ENCOUNTER — APPOINTMENT (OUTPATIENT)
Dept: ORTHOPEDIC SURGERY | Facility: CLINIC | Age: 80
End: 2025-06-10
Payer: MEDICARE

## 2025-06-10 DIAGNOSIS — M54.12 CERVICAL RADICULOPATHY: ICD-10-CM

## 2025-06-10 DIAGNOSIS — M47.812 CERVICAL SPONDYLOSIS WITHOUT MYELOPATHY: Primary | ICD-10-CM

## 2025-06-10 PROCEDURE — 1159F MED LIST DOCD IN RCRD: CPT | Performed by: STUDENT IN AN ORGANIZED HEALTH CARE EDUCATION/TRAINING PROGRAM

## 2025-06-10 PROCEDURE — 99213 OFFICE O/P EST LOW 20 MIN: CPT | Performed by: STUDENT IN AN ORGANIZED HEALTH CARE EDUCATION/TRAINING PROGRAM

## 2025-06-10 PROCEDURE — 1160F RVW MEDS BY RX/DR IN RCRD: CPT | Performed by: STUDENT IN AN ORGANIZED HEALTH CARE EDUCATION/TRAINING PROGRAM

## 2025-06-10 SDOH — SOCIAL STABILITY: SOCIAL NETWORK: SOCIAL ACTIVITY:: 7

## 2025-06-10 NOTE — PROGRESS NOTES
Follow up Consult/Patient Note    6/10/2025   No ref. provider found    Assessment: Patient is a 79-year-old female presenting with chronic neck pain with pain generators likely being cervical spondylosis and cervical radiculopathy.  Patient also has a history of chronic lumbar back pain which she did not delve into today.  - Cervical radiculopathy likely C6 vs C7  - Cervical spondylosis     PLAN:  1)  Imaging/Diagnostic Studies: X-ray cervical spine ordered and personally interpreted notable for reversal of normal cervical lordosis, narrowing at C3-4, C4-5, C5-6 and C6-7 disc spaces.  No fractures or dislocations or lesions.  2)  Therapy/Rehabilitation: Continue physical therapy and encouraging home exercise plan  3)  Pharmacological Management: Can continue ibuprofen as prescribed, and takes Tylenol 650mg. Avoid Gabapentin due to previous adverse reaction.  - Non-pharmacological interventions: Continue heat therapy and Aspercreme patches/cream.  4)  Spine/Surgical Interventions: None at this time  5)  Alternative Treatments: May consider alternative treatment options in the future including manipulation (chiropractor versus osteopathic) and/or acupuncture if patient does not obtain optimal relief with initial treatment plan.  6)  Consultations: Physical therapy  7)  Follow -up: 4-6 weeks or PRN if symptoms worsen/do not improve.   - Follow-up: With Dr. Meza in approximately 2 months (around 08/05/2025) due to provider relocation.  8)  Future treatment considerations: Medication management, alternative therapies, possible cervical epidural steroid injections i.e. C7-T1 pending updated advanced imaging.  Will consider MRI after physical therapy trial.  Additional Notes:  - Discussed importance of home safety with grab bars and avoiding falls given balance issues. Please follow up with ENT for vertigo & tinnitus.    Patient advised of the difference between hurt and harm and advised to continue with all normal  activities and exercises. Patient verbalized understanding of the above plan and was happy with the care provided.      The above clinical summary has been dictated with voice recognition software. It has not been proofread for grammatical errors, typographical mistakes, or other semantic inconsistencies.    Thank you for visiting our office today. It was our pleasure to take part in your healthcare.     Do not hesitate to call with any questions regarding your plan of care after leaving at [phone]    To clinicians, thank you very much for this kind referral. It is a privilege to partner with you in the care of your patients. My office would be delighted to assist you with any further consultations or with questions regarding the plan of care outlined. Do not hesitate to call the office or contact me directly.     Sincerely,    Azul Ramirez MD  Fellow MSK & Spine, PM&R  [facility] [employer]  [facility]      Update 6/10/25:  Patient does describe tingling in her left upper arm. Patient has significant relief from the Aspercreme patch. Patient states her average is a 5/10 for pain and it goes up higher to a 7-8/10.  Patient also discusses a history of tinnitis.     - Presents with radiating pain and tingling in left neck, upper arm (triceps area), and middle back. Pain fluctuates and sometimes extends lower. Reports tingling when lying on left side too long.  - Pain severity: 5-7/10 depending on activity. Reports increased pain with kitchen activities.  - Current medications: ibuprofen once daily with Maalox, Tylenol 650mg (reluctant to take regularly), Aspercreme patches and cream (reports good relief).  - Non-pharmacological interventions: heat application.  - Previous injections in low back in 2009, none in neck.  - Reports dizziness with head movement.  - Tingling extends to left wrist but not to fingertips.  - Previous adverse reactions to  Gabapentin (hallucinations) and opioids (mental fog).  - Social: uses grab bars in bathroom and furniture for stability at home. Does not use cane indoors.      Recall,   Patient reports that she was doing physical therapy in 2023 for gait imbalance. She ambulates with a cane. Patient describes radiating pain, she states a tingling near her left scapula. Patient reports this changes with position.  She describes neck pain that is dull and throbbing on the left side.    Patient separately reports previous treatment modalities for her low back including acupuncture, epidural steroid injections in 2009, Flexeril, Robaxin, tramadol all in the past.  Patient's last 5 interventions were in approximately 2018.  Patient reports that her cervical pain is significantly worse and would like to focus on that today.    Of note patient had left carpal tunnel surgery at  in 7/29/2022 by Dr. Bynum.   In patient's chart history there is charting in 2023 noting poor balance when turning, with urgent care following a fall 2/2023 per Dr Lopez note in 2023.    Patient was started on gabapentin by neurology, she had to stop it due to hallucinations.    Vicki Chaney   is a 79 y.o. female who presents with   Location: Left neck pain that goes down to the scapula  Radiation: to left scapulothoracic area  Quality: sharp and dull; pulsating      current 4-5/10,  at its worst 10 /10  Exacerbated by certain positions in sitting   Relieved by changing body positions   Onset, traumatic event:  no  Has tried:  aspercreme, icyhot, salonpas    Valsalva sign is no  Grocery cart sign is no  Smoker:  no  Does not wake them at night    Patient denies bowel/bladder incontinence, denies fever, denies unintentional weight loss, denies clumsiness of hands, feet, or dropping things.  Denies any constitutional or myelopathic symptomatology.      PREVIOUS TREATMENTS  IN THE LAST SIX MONTHS     Active conservative therapy  in the last six months (see  "below)              1. Physical therapy:   no                                                                                  2. Home exercise program after PT:                                                      3. A physician supervised home exercise program (HEP):                 4. Chiropractic Care:    no                                                                  Passive conservative therapy  in the last six months (see below)              1. NSAIDS: Ibuprofen                                                                                                        2. Prescription pain medication:                                                              3. Acupuncture:                                                                                             4. Tens unit:      Assistive Devices: Cane    Work status: retired       ROS: Other than listed in HPI, PMHX below, and intake paperwork including a 30 point patient-recorded review of symptoms which was personally reviewed and inclusive of no history of unintentional weight loss, change in appetite, significant malaise, fevers, chills, or change in bowel/bladder, shortness of breath, or chest pain.    I have confirmed and edited as necessary Past Medical, Past Surgical, Family, Social History and ROS as obtained by others. These were also obtained on new patient forms.      PHYSICAL EXAM:   GENERAL APPEARANCE:  Well nourished, well developed, and no apparent distress.  NEURO PSYCH: Patient oriented to person, place, Mood pleasant. Benign affect.  MUSCULOSKELETAL and NEUROLOGICAL       VISUAL INSPECTION          CERVICAL: WNL          THORACIC: WNL           LUMBAR: WNL  SPINE ROM:   CERVICAL ROM: Pain with looking to left cervical spine.  Mild discomfort with looking to the right.  Also forward flexion without significant pain, extension with tightness, lateral rotation bilaterally without significant pain, lateral flexion with \"twitching\" " sensation bilaterally (greater on left).      PALPATION:           SPINOUS PROCESS: No TTP           PARASPINALS: Tightness noted left cervical paraspinals  FACET LOADING: Positive left  MUSCLE BULK: Normal and symmetrical in the upper extremities.  MUSCLE TONE: Normal  MOTOR: 5/5 in all muscle groups tested in bilateral upper and lower extremities   SENSORY: Normal sensory exam to light touch  GAIT: Normal.  Able to go up and heels and toes with no sig. weakness.  Gait mildly antalgic, slowed with cane.- unchanged  REFLEXES: +1 to bilateral U extremities  LONG TRACT SIGNS: No clonus, Neg Hoffmans.  PERIPHERAL JOINT ROM:   SHOULDER ROM: Full bilaterally   SPURLING'S TEST: Negative  BAKODY'S SIGN:  No sig. pain with overhead activity      DATA REVIEW:   The below imaging studies were personally reviewed and discussed with the patient.    Medical Decision Making:  The above note constitutes a Moderate to High level of medical decision making based on past data and imaging review, new and chronic symptoms with exacerbation, change in weakness or sensation, new imaging and diagnostic studies ordered, discussion of potential interventional or surgical treatment options, acute or chronic pain that may pose a threat to bodily function.    Medical History[1]    Medication Documentation Review Audit       Reviewed by Sonal Baptiste RN (Registered Nurse) on 06/10/25 at 1355      Medication Order Taking? Sig Documenting Provider Last Dose Status   hydrOXYzine HCL (Atarax) 10 mg tablet [id number] No Take 1 tablet (10 mg) by mouth once daily at bedtime. Historical Provider, MD Taking Active   ibuprofen 800 mg tablet [id number]  Take 1 tablet (800 mg) by mouth 3 times a day. João Bynum MD  Active   sertraline (Zoloft) 100 mg tablet [id number] No take 1 TABLET or 1 and ONE-HALF TABLET BY MOUTH IN THE MORNING Historical Provider, MD Taking Active                    RX Allergies[2]    Social History     Socioeconomic History     Marital status:      Spouse name: Not on file    Number of children: Not on file    Years of education: Not on file    Highest education level: Not on file   Occupational History    Not on file   Tobacco Use    Smoking status: Never    Smokeless tobacco: Never   Substance and Sexual Activity    Alcohol use: Yes    Drug use: Never    Sexual activity: Not on file   Other Topics Concern    Not on file   Social History Narrative    Not on file     Social Drivers of Health     Financial Resource Strain: Not on file   Food Insecurity: Not on file   Transportation Needs: Not on file   Physical Activity: Not on file   Stress: Not on file   Social Connections: Not on file   Intimate Partner Violence: Not on file   Housing Stability: Not on file       Surgical History[3]         [1]   Past Medical History:  Diagnosis Date    Anxiety disorder, unspecified     Anxiety and depression    Personal history of other diseases of the musculoskeletal system and connective tissue     History of arthritis   [2]   Allergies  Allergen Reactions    Ciprofloxacin Unknown    Diazepam Unknown    Erythromycin Unknown    Hydrocodone-Acetaminophen Unknown     Other reaction(s): Mental Status Change    Oxycodone Unknown    Oxycodone-Acetaminophen Confusion and Unknown    Prednisone Hallucinations    Pregabalin Unknown     Other reaction(s): Intolerance   diarrhea, dizziness, and sensation of feeling hot per 2008 and 2009 pain management notes. (Dr White)   [3]   Past Surgical History:  Procedure Laterality Date    OTHER SURGICAL HISTORY  09/01/2022    Carpal tunnel surgery    OTHER SURGICAL HISTORY  05/09/2022    Tubal ligation

## 2025-06-11 ENCOUNTER — TREATMENT (OUTPATIENT)
Dept: PHYSICAL THERAPY | Facility: CLINIC | Age: 80
End: 2025-06-11
Payer: MEDICARE

## 2025-06-11 DIAGNOSIS — M47.812 CERVICAL SPONDYLOSIS: ICD-10-CM

## 2025-06-11 DIAGNOSIS — M54.12 CERVICAL RADICULOPATHY: ICD-10-CM

## 2025-06-11 PROCEDURE — 97110 THERAPEUTIC EXERCISES: CPT | Mod: GP

## 2025-06-11 PROCEDURE — 97140 MANUAL THERAPY 1/> REGIONS: CPT | Mod: GP

## 2025-06-11 NOTE — PROGRESS NOTES
"Patient Name Vicki Chaney   MRN: [id number]  Today's Date: 6/11/2025  Time Calculation  Start Time: 1540  Stop Time: 1635  Time Calculation (min): 55 min    Insurance:    (per information provided by  pre-cert team)  - Medicare $0 applied  -Certification dates: 5/21/25 to 8/19/25  Visit #: 2     Therapy Diagnoses:   1. Cervical spondylosis  Follow Up In Physical Therapy      2. Cervical radiculopathy  Follow Up In Physical Therapy          General:  Reason for visit: c-spine and t-spine pain, L shoulder pain   Referred by: Dr Azul Park MD appt:  unknown  Preferred Name:  Vicki  Script:  eval and treat  Onset Date:  5/21/22  Last re-assessment date:  n/a  Patient's Email: unknown    Subjective:   Patient reports:  She has been doing the HEP and has been tolerating it well.     Have you fallen since last visit:  no    Precautions:   PMHx: neuropathy B feet, Osteoporosis, OA, Scoliosis from childhood, knee and elbow OA     Medications for pain: 800 mg ibuprofen     Diagnostic Tests: x-rays  IMPRESSION:  Degenerative changes and reversal normal cervical lordosis.     Fall risk: Steadi = 9, moderate fall risk    Pain:  4/10  Location/Type of pain:  c-t spine region and L shoulder    HEP compliance/understanding:  yes    Objective:   Objective Measurements:  n/a    Treatment:   **= HEP  NV= Next visit  np= not performed  nb= non-billable  G= group HEP= discharged to HEP      Therapeutic Exercise:    40  minutes  Nu-step: L1 arms only 5 mins warm up/ subjective taken  Seated scapular retraction: 5\" x 10 **  Shoulder rolls: x 10   C-spine AROM rotation: x 10 each **  Gentle levator scap stretch: 20\" x 3 each **  Cane shoulder flexion: 2\" x 10 **  Cane shoulder IR/ER: 5\" x 10 each **  Cane shoulder extension: 5\" x 10 **  Isometric shoulder IR/ ER: 5\" x 10 each **  T-band rows: red 10 x 2 **  Sit to stand with armrests: 5 x 2 **    Manual Therapy:    15   minutes  Seated STM/ TPR to B upper trap/ scapular " region and L shoulder    Neuromuscular Re-education:      minutes        Assessment:  Patient tolerated treatment well, did well with progression this date.  She had decreased pain and increased mobility following manual techniques.   Patient needs continued work on/skilled PT for: functional / postural mobility and strength and for balance training to address remaining functional, objective and subjective deficits to allow them to return to prior /optimal level of function with ADLs.  Patient is progressing with goals: pain reduction, ROM, flexibility, strength, posture, HEP  Skilled care:  therapeutic exercise, pt education, manual techniques.     Plan:    Continue to progress per poc:   NV add t-band extension  Continue with manual techniques at tolerated.     Education:    Pt ed on therapeutic exercise technique, posture, HEP  HEP Progression:    Access Code: [email or web address]  URL: [email or web address]  Date: 06/11/2025  Prepared by: Elvia Murphy    Exercises  - Seated Scapular Retraction  - 2-3 x daily - 7 x weekly - 1-2 sets - 10 reps - 5 hold  - Seated Cervical Rotation AROM  - 2-3 x daily - 7 x weekly - 1-2 sets - 10 reps - 3 hold  - Gentle Levator Scapulae Stretch  - 1-2 x daily - 7 x weekly - 2-3 sets - 20 hold  - Shoulder Flexion Overhead with Dowel  - 1-2 x daily - 7 x weekly - 1-2 sets - 5-10 reps - 2 hold  - Standing Shoulder Extension ROM with Dowel  - 1-2 x daily - 7 x weekly - 1-2 sets - 5-10 reps - 5 hold  - Seated Shoulder External Rotation AAROM with Dowel  - 1-2 x daily - 7 x weekly - 1-2 sets - 10 reps - 5 hold  - Standing Isometric Shoulder Internal Rotation at Doorway  - 3-4 x weekly - 1-2 sets - 10 reps - 5 hold  - Standing Isometric Shoulder External Rotation with Doorway  - 3-4 x weekly - 1-2 sets - 10 reps - 5 hold  - Standing Row with Anchored Resistance  - 3-4 x weekly - 1-2 sets - 10 reps - 2 hold  - Sit to Stand with Counter Support  - 1-2 x daily - 7 x weekly - 1-2  sets - 5-10 reps

## 2025-06-24 ENCOUNTER — TREATMENT (OUTPATIENT)
Dept: PHYSICAL THERAPY | Facility: CLINIC | Age: 80
End: 2025-06-24
Payer: MEDICARE

## 2025-06-24 DIAGNOSIS — M54.12 CERVICAL RADICULOPATHY: ICD-10-CM

## 2025-06-24 DIAGNOSIS — M47.812 CERVICAL SPONDYLOSIS: ICD-10-CM

## 2025-06-24 PROCEDURE — 97140 MANUAL THERAPY 1/> REGIONS: CPT | Mod: GP

## 2025-06-24 PROCEDURE — 97110 THERAPEUTIC EXERCISES: CPT | Mod: GP

## 2025-06-24 NOTE — PROGRESS NOTES
"Patient Name Vicki Chaney   MRN: [id number]  Today's Date: 6/24/2025  Time Calculation  Start Time: 0944  Stop Time: 1028  Time Calculation (min): 44 min    Insurance:    (per information provided by  pre-cert team)  - Medicare $0 applied  -Certification dates: 5/21/25 to 8/19/25  Visit #: 3     Therapy Diagnoses:   1. Cervical spondylosis  Follow Up In Physical Therapy      2. Cervical radiculopathy  Follow Up In Physical Therapy            General:  Reason for visit: c-spine and t-spine pain, L shoulder pain   Referred by: Dr Azul Park MD appt:  unknown  Preferred Name:  Vicki  Script:  eval and treat  Onset Date:  5/21/22  Last re-assessment date:  n/a  Patient's Email: unknown    Subjective:   Patient reports:  She has been doing the HEP and has been tolerating it well.     Have you fallen since last visit:  no    Precautions:   PMHx: neuropathy B feet, Osteoporosis, OA, Scoliosis from childhood, knee and elbow OA     Medications for pain: 800 mg ibuprofen     Diagnostic Tests: x-rays  IMPRESSION:  Degenerative changes and reversal normal cervical lordosis.     Fall risk: Steadi = 9, moderate fall risk    Pain:  4/10  Location/Type of pain:  c-t spine region and L shoulder    HEP compliance/understanding:  yes    Objective:   Objective Measurements:  n/a    Treatment:   **= HEP  NV= Next visit  np= not performed  nb= non-billable  G= group HEP= discharged to HEP      Therapeutic Exercise:    4034 minutes  Nu-step: L1 arms only 5 mins warm up/ subjective taken-NP  UBE, level 1 forward and back 2 min each, therapist assist in the beginning  Seated scapular retraction: 5\" x 10   Shoulder rolls: x 10   C-spine AROM rotation: x 10 each   Gentle levator scap stretch: 20\" x 3 each   Cane shoulder flexion: 2\" x 10   Cane shoulder IR/ER: 5\" x 10 each   Cane shoulder extension: 5\" x 10   Isometric shoulder IR/ ER: 5\" x 10 each   T-band rows: red 10 x 2   T-band should extension:  red 10 x 2  Sit to " stand with armrests: 5 x 2-NP    Manual Therapy:     10 minutes  Seated STM/ TPR to B upper trap/ scapular region and L shoulder  Supine gentle cervical distraction     Neuromuscular Re-education:      minutes        Assessment:  Patient tolerated treatment fairly, tolerated new exercises except complained of knee pain which is on/off an issue for her.  Patient gets cortisone shots regularly.   Patient responded well to gentle cervical distraction.  She had decreased pain and increased mobility following manual techniques.   Patient needs continued work on/skilled PT for: functional / postural mobility and strength and for balance training to address remaining functional, objective and subjective deficits to allow them to return to prior /optimal level of function with ADLs.  Patient is progressing with goals: pain reduction, ROM, flexibility, strength, posture, HEP  Skilled care:  therapeutic exercise, pt education, manual techniques.     Plan:    Continue to progress per poc:   Resume sit to stand next session  Continue with manual techniques at tolerated.     Education:    Pt ed on therapeutic exercise technique, posture, HEP  HEP Progression:    Exercises  Access Code: [email or web address]  URL: [email or web address]  Date: 06/24/2025  Prepared by: Santiago Mcfarlane    Exercises  - Seated Scapular Retraction  - 2-3 x daily - 7 x weekly - 1-2 sets - 10 reps - 5 hold  - Seated Cervical Rotation AROM  - 2-3 x daily - 7 x weekly - 1-2 sets - 10 reps - 3 hold  - Gentle Levator Scapulae Stretch  - 1-2 x daily - 7 x weekly - 2-3 sets - 20 hold  - Shoulder Flexion Overhead with Dowel  - 1-2 x daily - 7 x weekly - 1-2 sets - 5-10 reps - 2 hold  - Standing Shoulder Extension ROM with Dowel  - 1-2 x daily - 7 x weekly - 1-2 sets - 5-10 reps - 5 hold  - Seated Shoulder External Rotation AAROM with Dowel  - 1-2 x daily - 7 x weekly - 1-2 sets - 10 reps - 5 hold  - Standing Isometric Shoulder Internal Rotation at  Doorway  - 3-4 x weekly - 1-2 sets - 10 reps - 5 hold  - Standing Isometric Shoulder External Rotation with Doorway  - 3-4 x weekly - 1-2 sets - 10 reps - 5 hold  - Standing Row with Anchored Resistance  - 3-4 x weekly - 1-2 sets - 10 reps - 2 hold  - Sit to Stand with Counter Support  - 1-2 x daily - 7 x weekly - 1-2 sets - 5-10 reps  - Shoulder Extension with Resistance  - 1 x daily - 7 x weekly - 2 sets - 10 reps  - Shoulder External Rotation and Scapular Retraction with Resistance  - 1 x daily - 7 x weekly - 2 sets - 10 reps

## 2025-06-26 ENCOUNTER — APPOINTMENT (OUTPATIENT)
Dept: PHYSICAL THERAPY | Facility: CLINIC | Age: 80
End: 2025-06-26
Payer: MEDICARE

## 2025-07-08 ENCOUNTER — TREATMENT (OUTPATIENT)
Dept: PHYSICAL THERAPY | Facility: CLINIC | Age: 80
End: 2025-07-08
Payer: MEDICARE

## 2025-07-08 DIAGNOSIS — M54.12 CERVICAL RADICULOPATHY: ICD-10-CM

## 2025-07-08 DIAGNOSIS — M47.812 CERVICAL SPONDYLOSIS: ICD-10-CM

## 2025-07-08 PROCEDURE — 97110 THERAPEUTIC EXERCISES: CPT | Mod: GP,CQ

## 2025-07-08 PROCEDURE — 97140 MANUAL THERAPY 1/> REGIONS: CPT | Mod: GP,CQ

## 2025-07-08 NOTE — PROGRESS NOTES
"Patient Name Vicki Chaney   MRN: [id number]  Today's Date: 7/8/2025  Time Calculation  Start Time: 0315  Stop Time: 0400  Time Calculation (min): 45 min    Insurance:    (per information provided by  pre-cert team)  - Medicare $0 applied  -Certification dates: 5/21/25 to 8/19/25  Visit #: 4     Therapy Diagnoses:   1. Cervical spondylosis  Follow Up In Physical Therapy      2. Cervical radiculopathy  Follow Up In Physical Therapy            General:  Reason for visit: c-spine and t-spine pain, L shoulder pain   Referred by: Dr Azul Park MD appt:  unknown  Preferred Name:  Vicki  Script:  eval and treat  Onset Date:  5/21/22  Last re-assessment date:  n/a  Patient's Email: unknown    Subjective:   Patient reports her knees are bothering her and plans to schedule a cortisone injection for them. Her neck and left shoulder are sore.     Have you fallen since last visit:  no    Precautions:   PMHx: neuropathy B feet, Osteoporosis, OA, Scoliosis from childhood, knee and elbow OA     Medications for pain: 800 mg ibuprofen     Diagnostic Tests: x-rays  IMPRESSION:  Degenerative changes and reversal normal cervical lordosis.     Fall risk: Steadi = 9, moderate fall risk    Pain:  5/10  Location/Type of pain:  cervical, L shoulder, rogerio knees    HEP compliance/understanding:  yes, going well    Objective:   Objective Measurements:    Function: Could not go to a Holiday party due to being unable to use their stairs. Has balance issues.   Posture: rounded shoulders    Treatment:   **= HEP  NV= Next visit  np= not performed  nb= non-billable  G= group HEP= discharged to [facility]      Therapeutic Exercise:      4035 minutes  Nu-step: L3 8 minutes warm up/ subjective taken-NP  UBE, level 1 forward and back 2 min each, therapist assist in the beginning NP  Seated scapular retraction: 5\" 2 x 10   Shoulder rolls: x 10   C-spine AROM rotation: x 10 each   Gentle levator scap stretch: 20\" x 3 each  Scalene stretch 30\" x 3 " "rogerio  Upper trap stretch 30\" x 3 rogerio   Cane shoulder flexion: 2\" x 10   Cane shoulder IR/ER: 5\" x 10 each   Cane shoulder extension: 5\" x 10   Isometric shoulder IR/ ER: 5\" x 10 each   T-band rows: H M red 10 x 2   T-band should extension:  red 10 x 2    Manual Therapy:     10 minutes  Seated STM/ TPR to B upper trap/ scapular region and L shoulder  Seated manual cervical stretches upper trap, lev scap 30\" x 3 rogerio    Neuromuscular Re-education:      minutes  STS NP      Assessment:  Patient tolerated treatment well with improved cervical mobility and reduced pain with cervical stretches and manual treatment. Required tactile cueing to decrease upper trap compensation with strengthening and stretching.   Patient needs continued work on/skilled PT for: functional / postural mobility and strength and for balance training to address remaining functional, objective and subjective deficits to allow them to return to prior /optimal level of function with ADLs.  Patient is progressing with goals: pain reduction, ROM, flexibility, strength, posture, HEP  Skilled care:  therapeutic exercise, pt education, manual techniques., HEP progression/overview    Plan:    Continue to progress per POC:   NV add Tband scap depression   Continue with manual techniques at tolerated.     Education:    Pt ed on therapeutic exercise technique, posture, HEP    HEP Progression:    Access Code: [email or web address]  URL: [email or web address]  Date: 07/08/2025  Prepared by: Lakshmi Cisse    Exercises  - Seated Scapular Retraction  - 2-3 x daily - 7 x weekly - 1-2 sets - 10 reps - 5 hold  - Seated Cervical Rotation AROM  - 2-3 x daily - 7 x weekly - 1-2 sets - 10 reps - 3 hold  - Seated Upper Trapezius Stretch  - 1-2 x daily - 7 x weekly - 1 sets - 3 reps - 30 seconds hold  - Gentle Levator Scapulae Stretch  - 1-2 x daily - 7 x weekly - 2-3 sets - 20 hold  - Shoulder Flexion Overhead with Dowel  - 1-2 x daily - 7 x weekly - 1-2 sets - " 5-10 reps - 2 hold  - Standing Shoulder Extension ROM with Dowel  - 1-2 x daily - 7 x weekly - 1-2 sets - 5-10 reps - 5 hold  - Seated Shoulder External Rotation AAROM with Dowel  - 1-2 x daily - 7 x weekly - 1-2 sets - 10 reps - 5 hold  - Standing Isometric Shoulder Internal Rotation at Doorway  - 3-4 x weekly - 1-2 sets - 10 reps - 5 hold  - Standing Isometric Shoulder External Rotation with Doorway  - 3-4 x weekly - 1-2 sets - 10 reps - 5 hold  - Sit to Stand with Counter Support  - 1-2 x daily - 7 x weekly - 1-2 sets - 5-10 reps  - Shoulder External Rotation and Scapular Retraction with Resistance  - 1 x daily - 7 x weekly - 2 sets - 10 reps  - Seated Scalenes Stretch  - 1-2 x daily - 7 x weekly - 1 sets - 30 reps  - Seated Shoulder Row with Anchored Resistance  - 1 x daily - 3-4 x weekly - 1-2 sets - 10 reps  - Seated Shoulder Extension and Scapular Retraction with Resistance  - 1 x daily - 3-4 x weekly - 1-2 sets - 10 reps  - Seated Shoulder Row with Anchored Resistance  - 1 x daily - 7 x weekly - 2 sets - 10 reps

## 2025-07-16 ENCOUNTER — TREATMENT (OUTPATIENT)
Dept: PHYSICAL THERAPY | Facility: CLINIC | Age: 80
End: 2025-07-16
Payer: MEDICARE

## 2025-07-16 DIAGNOSIS — M54.12 CERVICAL RADICULOPATHY: ICD-10-CM

## 2025-07-16 DIAGNOSIS — M47.812 CERVICAL SPONDYLOSIS: ICD-10-CM

## 2025-07-16 PROCEDURE — 97110 THERAPEUTIC EXERCISES: CPT | Mod: GP,CQ

## 2025-07-16 NOTE — PROGRESS NOTES
Patient Name Vicki Chaney   MRN: [id number]  Today's Date: 7/16/2025  Time Calculation  Start Time: 0102  Stop Time: 0147  Time Calculation (min): 45 min    Insurance:    (per information provided by  pre-cert team)  - Medicare $0 applied  -Certification dates: 5/21/25 to 8/19/25  Visit #: 5     Therapy Diagnoses:   1. Cervical spondylosis  Follow Up In Physical Therapy      2. Cervical radiculopathy  Follow Up In Physical Therapy            General:  Reason for visit: c-spine and t-spine pain, L shoulder pain   Referred by: Dr Azul Park MD appt:  unknown  Preferred Name:  Vicki  Script:  eval and treat  Onset Date:  5/21/22  Last re-assessment date:  n/a  Patient's Email: unknown    Subjective:   Patient reports her neck is feeling ok, bur her veritgo is making her feel wobbly today. She reports she has not seen anyone about her vertigo but may in the future. She does feel this is the cause of some of her balance issues Reports she has decided to make today her last day for her neck as she is good with her exercises. Also states her neighbor is very involved with fitness and can help her as needed. As for her home activities she is careful when doing them for safety reasons but is able to do all she needs to.    Have you fallen since last visit:  no    Precautions:   PMHx: neuropathy B feet, Osteoporosis, OA, Scoliosis from childhood, knee and elbow OA     Medications for pain: 800 mg ibuprofen     Diagnostic Tests: x-rays  IMPRESSION:  Degenerative changes and reversal normal cervical lordosis.     Fall risk: Steadi = 9, moderate fall risk    Pain:  0/10  Location/Type of pain:  cervical, L shoulder, rogerio knees    HEP compliance/understanding:  yes, going well    Objective:   Objective Measurements:    Function: Performs all with safety in mind and paces herself as needed.  Posture: rounded shoulders    Treatment:   **= HEP  NV= Next visit  np= not performed  nb= non-billable  G= group HEP=  "discharged to HEP      Therapeutic Exercise:      45 minutes  Nu-step: L3 8 minutes warm up/ subjective taken  UBE, level 1 forward and back 2 min each, therapist assist in the beginning NP  Low door stretch 30 sec 3x   Seated scapular retraction: 5\" 2 x 10   Shoulder rolls: x 10   C-spine AROM rotation: x 10 each   Gentle levator scap stretch: 20\" x 3 each  Scalene stretch 30\" x 3 rogerio  Upper trap stretch 30\" x 3 rogerio   Cane shoulder flexion: 2\" x 10   Cane shoulder IR/ER: 5\" x 10 each   Cane shoulder extension: 5\" x 10   Isometric shoulder IR/ ER: 5\" x 10 each   T-band rows: H M red 10 x 2   T-band should extension:  red 10 x 2  Seated horizontal abduction red tband  2 x 10 **    Manual Therapy:       Minutes declined   Seated STM/ TPR to B upper trap/ scapular region and L shoulder  Seated manual cervical stretches upper trap, lev scap 30\" x 3 rogerio    Neuromuscular Re-education:      minutes  STS NP      Assessment:  Patient tolerated treatment well .Reviewed all exercises and progressed with doorway stretch for posture and seated horizontal abduction with red tband . Updated HEP   Patient is progressing with goals: Neck pain is 0 with only minimal LUE pain at momentary times.  Reports improved understanding of correct posture and feels she stands up  from seated forward posture to stretch as needed.  Able to rotate cervical spine without pain.  Using yellow and red tband for scapular strengthening at home.  No pain /tenderness with upper quarter palpation.  Balance /vertigo : May seek medical intervention for vertigo if it continues to be problematic.  Is independent with HEP  Outcome measure NDI   10    Skilled care:  therapeutic exercise, pt education, manual techniques., HEP progression/overview    Plan:    Continue to progress per POC:   Per pt. Request she will be discharged at this time. Has HEP, will also continue with neighbor who is \"fitness buff\" and can help her as needed. May seek medical intervention " for vertigo if she decides it is problematic    Education:    Pt ed on therapeutic exercise technique, posture, HEP    HEP Progression:          Access Code: [email or web address]  URL: [email or web address]  Date: 07/16/2025  Prepared by: Willow    Exercises  - Seated Scapular Retraction  - 2-3 x daily - 7 x weekly - 1-2 sets - 10 reps - 5 hold  - Seated Cervical Rotation AROM  - 2-3 x daily - 7 x weekly - 1-2 sets - 10 reps - 3 hold  - Seated Upper Trapezius Stretch  - 1-2 x daily - 7 x weekly - 1 sets - 3 reps - 30 seconds hold  - Gentle Levator Scapulae Stretch  - 1-2 x daily - 7 x weekly - 2-3 sets - 20 hold  - Shoulder Flexion Overhead with Dowel  - 1-2 x daily - 7 x weekly - 1-2 sets - 5-10 reps - 2 hold  - Standing Shoulder Extension ROM with Dowel  - 1-2 x daily - 7 x weekly - 1-2 sets - 5-10 reps - 5 hold  - Seated Shoulder External Rotation AAROM with Dowel  - 1-2 x daily - 7 x weekly - 1-2 sets - 10 reps - 5 hold  - Standing Isometric Shoulder Internal Rotation at Doorway  - 3-4 x weekly - 1-2 sets - 10 reps - 5 hold  - Standing Isometric Shoulder External Rotation with Doorway  - 3-4 x weekly - 1-2 sets - 10 reps - 5 hold  - Sit to Stand with Counter Support  - 1-2 x daily - 7 x weekly - 1-2 sets - 5-10 reps  - Shoulder External Rotation and Scapular Retraction with Resistance  - 1 x daily - 7 x weekly - 2 sets - 10 reps  - Seated Scalenes Stretch  - 1-2 x daily - 7 x weekly - 1 sets - 30 reps  - Seated Shoulder Row with Anchored Resistance  - 1 x daily - 3-4 x weekly - 1-2 sets - 10 reps  - Seated Shoulder Extension and Scapular Retraction with Resistance  - 1 x daily - 3-4 x weekly - 1-2 sets - 10 reps  - Seated Shoulder Horizontal Abduction with Resistance  - 1 x daily - 7 x weekly - 2 sets - 10 reps - 2-3 hold  - Doorway Pec Stretch at 60 Elevation  - 1 x daily - 7 x weekly - 2 sets - 10 reps - 2-3 hold

## 2025-07-22 ENCOUNTER — APPOINTMENT (OUTPATIENT)
Dept: PHYSICAL THERAPY | Facility: CLINIC | Age: 80
End: 2025-07-22
Payer: MEDICARE

## 2025-07-30 ENCOUNTER — APPOINTMENT (OUTPATIENT)
Dept: PHYSICAL THERAPY | Facility: CLINIC | Age: 80
End: 2025-07-30
Payer: MEDICARE

## 2025-08-07 ENCOUNTER — APPOINTMENT (OUTPATIENT)
Dept: ORTHOPEDIC SURGERY | Facility: CLINIC | Age: 80
End: 2025-08-07
Payer: MEDICARE

## 2025-08-07 DIAGNOSIS — M25.561 BILATERAL CHRONIC KNEE PAIN: Primary | ICD-10-CM

## 2025-08-07 DIAGNOSIS — G89.29 BILATERAL CHRONIC KNEE PAIN: Primary | ICD-10-CM

## 2025-08-07 DIAGNOSIS — M25.562 BILATERAL CHRONIC KNEE PAIN: Primary | ICD-10-CM

## 2025-08-07 DIAGNOSIS — M17.0 PRIMARY OSTEOARTHRITIS OF BOTH KNEES: ICD-10-CM

## 2025-08-07 PROCEDURE — 1160F RVW MEDS BY RX/DR IN RCRD: CPT | Performed by: ORTHOPAEDIC SURGERY

## 2025-08-07 PROCEDURE — 99213 OFFICE O/P EST LOW 20 MIN: CPT | Performed by: ORTHOPAEDIC SURGERY

## 2025-08-07 PROCEDURE — 1036F TOBACCO NON-USER: CPT | Performed by: ORTHOPAEDIC SURGERY

## 2025-08-07 PROCEDURE — 20610 DRAIN/INJ JOINT/BURSA W/O US: CPT | Performed by: ORTHOPAEDIC SURGERY

## 2025-08-07 PROCEDURE — 1159F MED LIST DOCD IN RCRD: CPT | Performed by: ORTHOPAEDIC SURGERY

## 2025-08-07 RX ORDER — TRIAMCINOLONE ACETONIDE 40 MG/ML
40 INJECTION, SUSPENSION INTRA-ARTICULAR; INTRAMUSCULAR
Status: COMPLETED | OUTPATIENT
Start: 2025-08-07 | End: 2025-08-07

## 2025-08-07 RX ORDER — LIDOCAINE HYDROCHLORIDE 20 MG/ML
2 INJECTION, SOLUTION INFILTRATION; PERINEURAL
Status: COMPLETED | OUTPATIENT
Start: 2025-08-07 | End: 2025-08-07

## 2025-08-07 RX ADMIN — LIDOCAINE HYDROCHLORIDE 2 ML: 20 INJECTION, SOLUTION INFILTRATION; PERINEURAL at 10:08

## 2025-08-07 RX ADMIN — TRIAMCINOLONE ACETONIDE 40 MG: 40 INJECTION, SUSPENSION INTRA-ARTICULAR; INTRAMUSCULAR at 10:08

## 2025-08-07 ASSESSMENT — ENCOUNTER SYMPTOMS
KNEE DEFORMITY: 1
KNEE SWELLING: 1

## 2025-08-07 NOTE — PROGRESS NOTES
Subjective    Patient ID: Vicki Chaney is a 80 y.o. female.    Chief Complaint: Follow-up of the Left Knee (FUV BL KNEE INJ LAST DONE 4/3/25) and Follow-up of the Right Knee     Last Surgery: No surgery found  Last Surgery Date: No surgery found    Left Knee     Right Knee       Patient returns today for follow-up of her bilateral knee arthritis.  She typically gets adequate relief with Kenalog injections.  Her last set of injections in both knees was approximately 4 months ago.  She denies any new traumas.  She has started using a cane more often to assist with ambulation.    Objective   Ortho Exam  The patient is in no acute distress.  She is using a cane in her right hand to assist with ambulation.  She does have a antalgic gait favoring her left leg.  Exam of her left knee reveals she has a moderate effusion.  There is no warmth erythema.  She is tender over the medial joint line as opposed to the lateral joint line.  Active range of motion is from 0 to greater than 120 degrees of flexion.  The knee was stable to varus and valgus stress testing.    Exam of the patient's right knee reveals there was a mild effusion.  There is no warmth erythema.  Again range of motion is from 0 to greater than 120 degrees of flexion.  The knee was stable to varus and valgus stress testing.  She was tender more along the medial joint line and the lateral joint line.        Assessment/Plan   Encounter Diagnoses:  Bilateral chronic knee pain    Primary osteoarthritis of both knees    The patient has known bilateral knee arthritis.  She wished undergo another set of Kenalog injections today.    L Inj/Asp: bilateral knee on 8/7/2025 10:08 AM  Indications: pain  Details: 22 G needle, anteromedial approach  Medications (Right): 40 mg triamcinolone acetonide 40 mg/mL; 2 mL lidocaine 20 mg/mL (2 %)  Medications (Left): 40 mg triamcinolone acetonide 40 mg/mL; 2 mL lidocaine 20 mg/mL (2 %)  Outcome: tolerated well, no immediate  complications  Procedure, treatment alternatives, risks and benefits explained, specific risks discussed. Consent was given by the patient. Immediately prior to procedure a time out was called to verify the correct patient, procedure, equipment, support staff and site/side marked as required. Patient was prepped and draped in the usual sterile fashion.         The patient was informed to takes about a week for the injections have an effect.  She otherwise will follow-up as her symptoms dictate.

## 2025-08-12 ENCOUNTER — APPOINTMENT (OUTPATIENT)
Dept: ORTHOPEDIC SURGERY | Facility: CLINIC | Age: 80
End: 2025-08-12
Payer: MEDICARE

## 2025-08-31 ENCOUNTER — APPOINTMENT (OUTPATIENT)
Dept: RADIOLOGY | Facility: HOSPITAL | Age: 80
End: 2025-08-31
Payer: MEDICARE

## 2025-08-31 ENCOUNTER — HOSPITAL ENCOUNTER (OUTPATIENT)
Facility: HOSPITAL | Age: 80
Setting detail: OBSERVATION
End: 2025-08-31
Admitting: STUDENT IN AN ORGANIZED HEALTH CARE EDUCATION/TRAINING PROGRAM
Payer: MEDICARE

## 2025-08-31 PROBLEM — R10.31 BILATERAL GROIN PAIN: Status: ACTIVE | Noted: 2025-08-31

## 2025-08-31 PROBLEM — R26.2 UNABLE TO AMBULATE: Status: ACTIVE | Noted: 2025-08-31
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
## 2025-08-31 PROBLEM — D72.829 LEUKOCYTOSIS: Status: ACTIVE | Noted: 2025-08-31

## 2025-08-31 PROBLEM — S09.90XA HEAD INJURY: Status: ACTIVE | Noted: 2025-08-31

## 2025-08-31 PROBLEM — D69.6 THROMBOCYTOPENIA: Status: ACTIVE | Noted: 2025-08-31

## 2025-08-31 PROBLEM — R10.32 BILATERAL GROIN PAIN: Status: ACTIVE | Noted: 2025-08-31

## 2025-08-31 PROCEDURE — 70450 CT HEAD/BRAIN W/O DYE: CPT

## 2025-08-31 PROCEDURE — 72125 CT NECK SPINE W/O DYE: CPT

## 2025-08-31 PROCEDURE — 73521 X-RAY EXAM HIPS BI 2 VIEWS: CPT

## 2025-08-31 SDOH — SOCIAL STABILITY: SOCIAL INSECURITY: WITHIN THE LAST YEAR, HAVE YOU BEEN AFRAID OF YOUR PARTNER OR EX-PARTNER?: NO

## 2025-08-31 SDOH — SOCIAL STABILITY: SOCIAL INSECURITY
WITHIN THE LAST YEAR, HAVE YOU BEEN RAPED OR FORCED TO HAVE ANY KIND OF SEXUAL ACTIVITY BY YOUR PARTNER OR EX-PARTNER?: NO

## 2025-08-31 SDOH — HEALTH STABILITY: MENTAL HEALTH: HOW OFTEN DO YOU HAVE SIX OR MORE DRINKS ON ONE OCCASION?: NEVER

## 2025-08-31 SDOH — HEALTH STABILITY: PHYSICAL HEALTH
HOW OFTEN DO YOU NEED TO HAVE SOMEONE HELP YOU WHEN YOU READ INSTRUCTIONS, PAMPHLETS, OR OTHER WRITTEN MATERIAL FROM YOUR DOCTOR OR PHARMACY?: NEVER

## 2025-08-31 SDOH — SOCIAL STABILITY: SOCIAL INSECURITY
WITHIN THE LAST YEAR, HAVE YOU BEEN KICKED, HIT, SLAPPED, OR OTHERWISE PHYSICALLY HURT BY YOUR PARTNER OR EX-PARTNER?: NO

## 2025-08-31 SDOH — SOCIAL STABILITY: SOCIAL NETWORK: HOW OFTEN DO YOU GET TOGETHER WITH FRIENDS OR RELATIVES?: THREE TIMES A WEEK

## 2025-08-31 SDOH — SOCIAL STABILITY: SOCIAL INSECURITY: ARE YOU MARRIED, WIDOWED, DIVORCED, SEPARATED, NEVER MARRIED, OR LIVING WITH A PARTNER?: MARRIED

## 2025-08-31 SDOH — ECONOMIC STABILITY: HOUSING INSECURITY: AT ANY TIME IN THE PAST 12 MONTHS, WERE YOU HOMELESS OR LIVING IN A SHELTER (INCLUDING NOW)?: NO

## 2025-08-31 SDOH — SOCIAL STABILITY: SOCIAL NETWORK: IN A TYPICAL WEEK, HOW MANY TIMES DO YOU TALK ON THE PHONE WITH FAMILY, FRIENDS, OR NEIGHBORS?: THREE TIMES A WEEK

## 2025-08-31 SDOH — HEALTH STABILITY: PHYSICAL HEALTH: ON AVERAGE, HOW MANY MINUTES DO YOU ENGAGE IN EXERCISE AT THIS LEVEL?: 0 MIN

## 2025-08-31 SDOH — HEALTH STABILITY: MENTAL HEALTH
DO YOU FEEL STRESS - TENSE, RESTLESS, NERVOUS, OR ANXIOUS, OR UNABLE TO SLEEP AT NIGHT BECAUSE YOUR MIND IS TROUBLED ALL THE TIME - THESE DAYS?: NOT AT ALL

## 2025-08-31 SDOH — ECONOMIC STABILITY: FOOD INSECURITY: HOW HARD IS IT FOR YOU TO PAY FOR THE VERY BASICS LIKE FOOD, HOUSING, MEDICAL CARE, AND HEATING?: NOT HARD AT ALL

## 2025-08-31 SDOH — ECONOMIC STABILITY: FOOD INSECURITY: WITHIN THE PAST 12 MONTHS, THE FOOD YOU BOUGHT JUST DIDN'T LAST AND YOU DIDN'T HAVE MONEY TO GET MORE.: NEVER TRUE

## 2025-08-31 SDOH — HEALTH STABILITY: MENTAL HEALTH: HOW MANY DRINKS CONTAINING ALCOHOL DO YOU HAVE ON A TYPICAL DAY WHEN YOU ARE DRINKING?: PATIENT DOES NOT DRINK

## 2025-08-31 SDOH — ECONOMIC STABILITY: HOUSING INSECURITY: IN THE LAST 12 MONTHS, WAS THERE A TIME WHEN YOU WERE NOT ABLE TO PAY THE MORTGAGE OR RENT ON TIME?: NO

## 2025-08-31 SDOH — SOCIAL STABILITY: SOCIAL INSECURITY: DO YOU FEEL UNSAFE GOING BACK TO THE PLACE WHERE YOU ARE LIVING?: NO

## 2025-08-31 SDOH — ECONOMIC STABILITY: TRANSPORTATION INSECURITY: IN THE PAST 12 MONTHS, HAS LACK OF TRANSPORTATION KEPT YOU FROM MEDICAL APPOINTMENTS OR FROM GETTING MEDICATIONS?: NO

## 2025-08-31 SDOH — SOCIAL STABILITY: SOCIAL INSECURITY: HAVE YOU HAD ANY THOUGHTS OF HARMING ANYONE ELSE?: NO

## 2025-08-31 SDOH — SOCIAL STABILITY: SOCIAL INSECURITY: ARE YOU OR HAVE YOU BEEN THREATENED OR ABUSED PHYSICALLY, EMOTIONALLY, OR SEXUALLY BY ANYONE?: NO

## 2025-08-31 SDOH — SOCIAL STABILITY: SOCIAL INSECURITY

## 2025-08-31 SDOH — SOCIAL STABILITY: SOCIAL INSECURITY: DO YOU FEEL ANYONE HAS EXPLOITED OR TAKEN ADVANTAGE OF YOU FINANCIALLY OR OF YOUR PERSONAL PROPERTY?: NO

## 2025-08-31 SDOH — ECONOMIC STABILITY: FOOD INSECURITY: WITHIN THE PAST 12 MONTHS, YOU WORRIED THAT YOUR FOOD WOULD RUN OUT BEFORE YOU GOT THE MONEY TO BUY MORE.: NEVER TRUE

## 2025-08-31 SDOH — SOCIAL STABILITY: SOCIAL INSECURITY: WITHIN THE LAST YEAR, HAVE YOU BEEN HUMILIATED OR EMOTIONALLY ABUSED IN OTHER WAYS BY YOUR PARTNER OR EX-PARTNER?: NO

## 2025-08-31 SDOH — ECONOMIC STABILITY: INCOME INSECURITY: IN THE PAST 12 MONTHS HAS THE ELECTRIC, GAS, OIL, OR WATER COMPANY THREATENED TO SHUT OFF SERVICES IN YOUR HOME?: NO

## 2025-08-31 SDOH — HEALTH STABILITY: MENTAL HEALTH: HOW OFTEN DO YOU HAVE A DRINK CONTAINING ALCOHOL?: NEVER

## 2025-08-31 SDOH — SOCIAL STABILITY: SOCIAL NETWORK: HOW OFTEN DO YOU ATTEND MEETINGS OF THE CLUBS OR ORGANIZATIONS YOU BELONG TO?: NEVER

## 2025-08-31 SDOH — ECONOMIC STABILITY: HOUSING INSECURITY: IN THE PAST 12 MONTHS, HOW MANY TIMES HAVE YOU MOVED WHERE YOU WERE LIVING?: 1

## 2025-08-31 SDOH — HEALTH STABILITY: PHYSICAL HEALTH: ON AVERAGE, HOW MANY DAYS PER WEEK DO YOU ENGAGE IN MODERATE TO STRENUOUS EXERCISE (LIKE A BRISK WALK)?: 0 DAYS

## 2025-08-31 SDOH — SOCIAL STABILITY: SOCIAL NETWORK
DO YOU BELONG TO ANY CLUBS OR ORGANIZATIONS SUCH AS CHURCH GROUPS, UNIONS, FRATERNAL OR ATHLETIC GROUPS, OR SCHOOL GROUPS?: NO

## 2025-08-31 SDOH — SOCIAL STABILITY: SOCIAL NETWORK: HOW OFTEN DO YOU ATTEND CHURCH OR RELIGIOUS SERVICES?: 1 TO 4 TIMES PER YEAR

## 2025-08-31 SDOH — HEALTH STABILITY: MENTAL HEALTH: EXPERIENCED ANY OF THE FOLLOWING LIFE EVENTS: OTHER (COMMENT)

## 2025-08-31 SDOH — SOCIAL STABILITY: SOCIAL INSECURITY: ABUSE: ADULT

## 2025-08-31 SDOH — SOCIAL STABILITY: SOCIAL INSECURITY: ARE THERE ANY APPARENT SIGNS OF INJURIES/BEHAVIORS THAT COULD BE RELATED TO ABUSE/NEGLECT?: NO

## 2025-08-31 SDOH — SOCIAL STABILITY: SOCIAL INSECURITY: WERE YOU ABLE TO COMPLETE ALL THE BEHAVIORAL HEALTH SCREENINGS?: YES

## 2025-08-31 SDOH — SOCIAL STABILITY: SOCIAL INSECURITY: HAS ANYONE EVER THREATENED TO HURT YOUR FAMILY OR YOUR PETS?: NO

## 2025-08-31 SDOH — SOCIAL STABILITY: SOCIAL INSECURITY: DOES ANYONE TRY TO KEEP YOU FROM HAVING/CONTACTING OTHER FRIENDS OR DOING THINGS OUTSIDE YOUR HOME?: NO

## 2025-08-31 SDOH — SOCIAL STABILITY: SOCIAL INSECURITY: HAVE YOU HAD THOUGHTS OF HARMING ANYONE ELSE?: NO

## 2025-08-31 ASSESSMENT — PAIN SCALES - GENERAL
PAINLEVEL_OUTOF10: 10 - WORST POSSIBLE PAIN
PAINLEVEL_OUTOF10: 8
PAINLEVEL_OUTOF10: 10 - WORST POSSIBLE PAIN

## 2025-08-31 ASSESSMENT — ACTIVITIES OF DAILY LIVING (ADL)
FEEDING YOURSELF: INDEPENDENT
BATHING: INDEPENDENT
LACK_OF_TRANSPORTATION: NO
HEARING - LEFT EAR: FUNCTIONAL
DRESSING YOURSELF: INDEPENDENT
PATIENT'S MEMORY ADEQUATE TO SAFELY COMPLETE DAILY ACTIVITIES?: YES
ADEQUATE_TO_COMPLETE_ADL: YES
TOILETING: INDEPENDENT
GROOMING: INDEPENDENT
JUDGMENT_ADEQUATE_SAFELY_COMPLETE_DAILY_ACTIVITIES: YES
HEARING - RIGHT EAR: FUNCTIONAL
WALKS IN HOME: INDEPENDENT
LACK_OF_TRANSPORTATION: NO

## 2025-08-31 ASSESSMENT — COGNITIVE AND FUNCTIONAL STATUS - GENERAL
STANDING UP FROM CHAIR USING ARMS: A LOT
HELP NEEDED FOR BATHING: A LITTLE
CLIMB 3 TO 5 STEPS WITH RAILING: TOTAL
DRESSING REGULAR LOWER BODY CLOTHING: A LOT
DAILY ACTIVITIY SCORE: 19
MOBILITY SCORE: 15
WALKING IN HOSPITAL ROOM: A LOT
PATIENT BASELINE BEDBOUND: NO
TOILETING: A LOT
MOVING TO AND FROM BED TO CHAIR: A LOT

## 2025-08-31 ASSESSMENT — PAIN DESCRIPTION - DESCRIPTORS: DESCRIPTORS: ACHING

## 2025-08-31 ASSESSMENT — LIFESTYLE VARIABLES
AUDIT-C TOTAL SCORE: 0
SKIP TO QUESTIONS 9-10: 1
AUDIT-C TOTAL SCORE: 0
AUDIT-C TOTAL SCORE: 0
SKIP TO QUESTIONS 9-10: 1
AUDIT-C TOTAL SCORE: 0
PRESCIPTION_ABUSE_PAST_12_MONTHS: NO
HOW MANY STANDARD DRINKS CONTAINING ALCOHOL DO YOU HAVE ON A TYPICAL DAY: PATIENT DOES NOT DRINK
SKIP TO QUESTIONS 9-10: 1
SUBSTANCE_ABUSE_PAST_12_MONTHS: NO
HOW OFTEN DO YOU HAVE 6 OR MORE DRINKS ON ONE OCCASION: NEVER
HOW OFTEN DO YOU HAVE A DRINK CONTAINING ALCOHOL: NEVER

## 2025-08-31 ASSESSMENT — PAIN - FUNCTIONAL ASSESSMENT
PAIN_FUNCTIONAL_ASSESSMENT: 0-10

## 2025-08-31 ASSESSMENT — PATIENT HEALTH QUESTIONNAIRE - PHQ9
2. FEELING DOWN, DEPRESSED OR HOPELESS: NOT AT ALL
1. LITTLE INTEREST OR PLEASURE IN DOING THINGS: NOT AT ALL
SUM OF ALL RESPONSES TO PHQ9 QUESTIONS 1 & 2: 0

## 2025-08-31 ASSESSMENT — PAIN DESCRIPTION - LOCATION: LOCATION: LEG

## 2025-08-31 ASSESSMENT — PAIN DESCRIPTION - ORIENTATION: ORIENTATION: RIGHT;LEFT

## 2025-09-01 ENCOUNTER — APPOINTMENT (OUTPATIENT)
Dept: RADIOLOGY | Facility: HOSPITAL | Age: 80
End: 2025-09-01
Payer: MEDICARE

## 2025-09-01 PROCEDURE — 72192 CT PELVIS W/O DYE: CPT

## 2025-09-01 ASSESSMENT — COGNITIVE AND FUNCTIONAL STATUS - GENERAL
DRESSING REGULAR LOWER BODY CLOTHING: A LOT
MOBILITY SCORE: 11
TOILETING: A LOT
CLIMB 3 TO 5 STEPS WITH RAILING: TOTAL
DRESSING REGULAR UPPER BODY CLOTHING: A LITTLE
WALKING IN HOSPITAL ROOM: TOTAL
MOVING TO AND FROM BED TO CHAIR: A LOT
PERSONAL GROOMING: A LOT
WALKING IN HOSPITAL ROOM: A LOT
HELP NEEDED FOR BATHING: A LOT
MOVING TO AND FROM BED TO CHAIR: A LOT
TOILETING: A LOT
DRESSING REGULAR LOWER BODY CLOTHING: TOTAL
DAILY ACTIVITIY SCORE: 19
MOBILITY SCORE: 15
TURNING FROM BACK TO SIDE WHILE IN FLAT BAD: A LOT
CLIMB 3 TO 5 STEPS WITH RAILING: TOTAL
HELP NEEDED FOR BATHING: A LITTLE
MOVING FROM LYING ON BACK TO SITTING ON SIDE OF FLAT BED WITH BEDRAILS: A LITTLE
DAILY ACTIVITIY SCORE: 14
STANDING UP FROM CHAIR USING ARMS: A LOT
STANDING UP FROM CHAIR USING ARMS: A LOT

## 2025-09-01 ASSESSMENT — PAIN DESCRIPTION - DESCRIPTORS
DESCRIPTORS: THROBBING
DESCRIPTORS: THROBBING

## 2025-09-01 ASSESSMENT — PAIN DESCRIPTION - ORIENTATION
ORIENTATION: MID
ORIENTATION: RIGHT;LEFT
ORIENTATION: RIGHT;LEFT

## 2025-09-01 ASSESSMENT — PAIN DESCRIPTION - LOCATION
LOCATION: HIP
LOCATION: GROIN
LOCATION: GROIN

## 2025-09-01 ASSESSMENT — PAIN SCALES - GENERAL
PAINLEVEL_OUTOF10: 10 - WORST POSSIBLE PAIN
PAINLEVEL_OUTOF10: 10 - WORST POSSIBLE PAIN
PAINLEVEL_OUTOF10: 3
PAINLEVEL_OUTOF10: 3
PAINLEVEL_OUTOF10: 10 - WORST POSSIBLE PAIN
PAINLEVEL_OUTOF10: 8
PAINLEVEL_OUTOF10: 9

## 2025-09-01 ASSESSMENT — ACTIVITIES OF DAILY LIVING (ADL): ADL_ASSISTANCE: INDEPENDENT

## 2025-09-01 ASSESSMENT — PAIN - FUNCTIONAL ASSESSMENT
PAIN_FUNCTIONAL_ASSESSMENT: 0-10

## 2025-09-02 ENCOUNTER — DOCUMENTATION (OUTPATIENT)
Dept: HOME HEALTH SERVICES | Facility: HOME HEALTH | Age: 80
End: 2025-09-02
Payer: MEDICARE

## 2025-09-02 ENCOUNTER — PHARMACY VISIT (OUTPATIENT)
Dept: PHARMACY | Facility: CLINIC | Age: 80
End: 2025-09-02
Payer: COMMERCIAL

## 2025-09-02 PROBLEM — D72.829 LEUKOCYTOSIS: Status: RESOLVED | Noted: 2025-08-31 | Resolved: 2025-09-02
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
## 2025-09-02 PROBLEM — S09.90XA HEAD INJURY: Status: RESOLVED | Noted: 2025-08-31 | Resolved: 2025-09-02

## 2025-09-02 PROBLEM — D69.6 THROMBOCYTOPENIA: Status: RESOLVED | Noted: 2025-08-31 | Resolved: 2025-09-02

## 2025-09-02 PROCEDURE — RXMED WILLOW AMBULATORY MEDICATION CHARGE

## 2025-09-02 ASSESSMENT — PAIN SCALES - GENERAL
PAINLEVEL_OUTOF10: 7
PAINLEVEL_OUTOF10: 4
PAINLEVEL_OUTOF10: 8
PAINLEVEL_OUTOF10: 4

## 2025-09-02 ASSESSMENT — PAIN - FUNCTIONAL ASSESSMENT
PAIN_FUNCTIONAL_ASSESSMENT: 0-10

## 2025-09-03 ENCOUNTER — HOSPITAL ENCOUNTER (INPATIENT)
Facility: HOSPITAL | Age: 80
LOS: 3 days | Discharge: NURSING, REHAB OR LONG-TERM CARE | End: 2025-09-06
Attending: EMERGENCY MEDICINE
Payer: MEDICARE

## 2025-09-03 DIAGNOSIS — R26.2 UNABLE TO AMBULATE: ICD-10-CM

## 2025-09-03 DIAGNOSIS — R53.1 WEAKNESS: ICD-10-CM

## 2025-09-03 DIAGNOSIS — E86.0 DEHYDRATION: ICD-10-CM

## 2025-09-03 DIAGNOSIS — S32.82XA MULTIPLE CLOSED FRACTURES OF PELVIS WITHOUT DISRUPTION OF PELVIC RING, INITIAL ENCOUNTER (MULTI): Primary | ICD-10-CM

## 2025-09-03 LAB
ALBUMIN SERPL BCP-MCNC: 4.3 G/DL (ref 3.4–5)
ALP SERPL-CCNC: 53 U/L (ref 33–136)
ALT SERPL W P-5'-P-CCNC: 23 U/L (ref 7–45)
ANION GAP SERPL CALC-SCNC: 14 MMOL/L (ref 10–20)
AST SERPL W P-5'-P-CCNC: 21 U/L (ref 9–39)
BASOPHILS # BLD AUTO: 0.05 X10*3/UL (ref 0–0.1)
BASOPHILS NFR BLD AUTO: 0.6 %
BILIRUB SERPL-MCNC: 0.8 MG/DL (ref 0–1.2)
BUN SERPL-MCNC: 19 MG/DL (ref 6–23)
CALCIUM SERPL-MCNC: 9.2 MG/DL (ref 8.6–10.3)
CHLORIDE SERPL-SCNC: 102 MMOL/L (ref 98–107)
CO2 SERPL-SCNC: 24 MMOL/L (ref 21–32)
CREAT SERPL-MCNC: 0.65 MG/DL (ref 0.5–1.05)
EGFRCR SERPLBLD CKD-EPI 2021: 88 ML/MIN/1.73M*2
EOSINOPHIL # BLD AUTO: 0.2 X10*3/UL (ref 0–0.4)
EOSINOPHIL NFR BLD AUTO: 2.4 %
ERYTHROCYTE [DISTWIDTH] IN BLOOD BY AUTOMATED COUNT: 14.1 % (ref 11.5–14.5)
GLUCOSE SERPL-MCNC: 123 MG/DL (ref 74–99)
HCT VFR BLD AUTO: 44.7 % (ref 36–46)
HGB BLD-MCNC: 14.7 G/DL (ref 12–16)
IMM GRANULOCYTES # BLD AUTO: 0.03 X10*3/UL (ref 0–0.5)
IMM GRANULOCYTES NFR BLD AUTO: 0.4 % (ref 0–0.9)
LYMPHOCYTES # BLD AUTO: 1.19 X10*3/UL (ref 0.8–3)
LYMPHOCYTES NFR BLD AUTO: 14.3 %
MCH RBC QN AUTO: 30 PG (ref 26–34)
MCHC RBC AUTO-ENTMCNC: 32.9 G/DL (ref 32–36)
MCV RBC AUTO: 91 FL (ref 80–100)
MONOCYTES # BLD AUTO: 0.53 X10*3/UL (ref 0.05–0.8)
MONOCYTES NFR BLD AUTO: 6.4 %
NEUTROPHILS # BLD AUTO: 6.31 X10*3/UL (ref 1.6–5.5)
NEUTROPHILS NFR BLD AUTO: 75.9 %
NRBC BLD-RTO: 0 /100 WBCS (ref 0–0)
PLATELET # BLD AUTO: 138 X10*3/UL (ref 150–450)
POTASSIUM SERPL-SCNC: 3.9 MMOL/L (ref 3.5–5.3)
PROT SERPL-MCNC: 7.6 G/DL (ref 6.4–8.2)
RBC # BLD AUTO: 4.9 X10*6/UL (ref 4–5.2)
SODIUM SERPL-SCNC: 136 MMOL/L (ref 136–145)
WBC # BLD AUTO: 8.3 X10*3/UL (ref 4.4–11.3)

## 2025-09-03 PROCEDURE — 1200000002 HC GENERAL ROOM WITH TELEMETRY DAILY

## 2025-09-03 PROCEDURE — 36415 COLL VENOUS BLD VENIPUNCTURE: CPT

## 2025-09-03 PROCEDURE — 85025 COMPLETE CBC W/AUTO DIFF WBC: CPT

## 2025-09-03 PROCEDURE — 2500000004 HC RX 250 GENERAL PHARMACY W/ HCPCS (ALT 636 FOR OP/ED)

## 2025-09-03 PROCEDURE — 99285 EMERGENCY DEPT VISIT HI MDM: CPT | Performed by: EMERGENCY MEDICINE

## 2025-09-03 PROCEDURE — 80053 COMPREHEN METABOLIC PANEL: CPT

## 2025-09-03 PROCEDURE — 99222 1ST HOSP IP/OBS MODERATE 55: CPT

## 2025-09-03 PROCEDURE — 2500000001 HC RX 250 WO HCPCS SELF ADMINISTERED DRUGS (ALT 637 FOR MEDICARE OP)

## 2025-09-03 RX ORDER — ALUMINUM HYDROXIDE, MAGNESIUM HYDROXIDE, AND SIMETHICONE 1200; 120; 1200 MG/30ML; MG/30ML; MG/30ML
30 SUSPENSION ORAL ONCE
Status: DISCONTINUED | OUTPATIENT
Start: 2025-09-03 | End: 2025-09-03

## 2025-09-03 RX ORDER — POLYETHYLENE GLYCOL 3350 17 G/17G
17 POWDER, FOR SOLUTION ORAL 2 TIMES DAILY PRN
Status: DISCONTINUED | OUTPATIENT
Start: 2025-09-03 | End: 2025-09-06 | Stop reason: HOSPADM

## 2025-09-03 RX ORDER — MELOXICAM 15 MG/1
15 TABLET ORAL DAILY
Status: DISCONTINUED | OUTPATIENT
Start: 2025-09-04 | End: 2025-09-06 | Stop reason: HOSPADM

## 2025-09-03 RX ORDER — ACETAMINOPHEN 325 MG/1
975 TABLET ORAL 3 TIMES DAILY
Status: DISCONTINUED | OUTPATIENT
Start: 2025-09-03 | End: 2025-09-06 | Stop reason: HOSPADM

## 2025-09-03 RX ORDER — ONDANSETRON HYDROCHLORIDE 2 MG/ML
4 INJECTION, SOLUTION INTRAVENOUS EVERY 6 HOURS PRN
Status: DISCONTINUED | OUTPATIENT
Start: 2025-09-03 | End: 2025-09-06 | Stop reason: HOSPADM

## 2025-09-03 RX ORDER — HYDROMORPHONE HYDROCHLORIDE 2 MG/1
1 TABLET ORAL EVERY 4 HOURS PRN
Refills: 0 | Status: DISCONTINUED | OUTPATIENT
Start: 2025-09-03 | End: 2025-09-04

## 2025-09-03 RX ORDER — MELOXICAM 15 MG/1
15 TABLET ORAL DAILY
Status: DISCONTINUED | OUTPATIENT
Start: 2025-09-03 | End: 2025-09-03

## 2025-09-03 RX ORDER — HYDROMORPHONE HYDROCHLORIDE 2 MG/1
3 TABLET ORAL EVERY 4 HOURS PRN
Refills: 0 | Status: DISCONTINUED | OUTPATIENT
Start: 2025-09-03 | End: 2025-09-06 | Stop reason: HOSPADM

## 2025-09-03 RX ORDER — HALOPERIDOL LACTATE 5 MG/ML
5 INJECTION, SOLUTION INTRAMUSCULAR ONCE
Status: DISCONTINUED | OUTPATIENT
Start: 2025-09-03 | End: 2025-09-03

## 2025-09-03 RX ORDER — SODIUM CHLORIDE, SODIUM LACTATE, POTASSIUM CHLORIDE, CALCIUM CHLORIDE 600; 310; 30; 20 MG/100ML; MG/100ML; MG/100ML; MG/100ML
100 INJECTION, SOLUTION INTRAVENOUS CONTINUOUS
Status: ACTIVE | OUTPATIENT
Start: 2025-09-03 | End: 2025-09-04

## 2025-09-03 RX ORDER — HYDROMORPHONE HYDROCHLORIDE 0.2 MG/ML
0.2 INJECTION INTRAMUSCULAR; INTRAVENOUS; SUBCUTANEOUS
Status: DISCONTINUED | OUTPATIENT
Start: 2025-09-03 | End: 2025-09-06 | Stop reason: HOSPADM

## 2025-09-03 RX ORDER — AMOXICILLIN 250 MG
1 CAPSULE ORAL 2 TIMES DAILY
Status: DISCONTINUED | OUTPATIENT
Start: 2025-09-03 | End: 2025-09-06 | Stop reason: HOSPADM

## 2025-09-03 RX ORDER — FAMOTIDINE 10 MG/ML
40 INJECTION, SOLUTION INTRAVENOUS ONCE
Status: DISCONTINUED | OUTPATIENT
Start: 2025-09-03 | End: 2025-09-03

## 2025-09-03 RX ORDER — KETOROLAC TROMETHAMINE 30 MG/ML
15 INJECTION, SOLUTION INTRAMUSCULAR; INTRAVENOUS ONCE
Status: COMPLETED | OUTPATIENT
Start: 2025-09-03 | End: 2025-09-03

## 2025-09-03 RX ORDER — ENOXAPARIN SODIUM 100 MG/ML
40 INJECTION SUBCUTANEOUS EVERY 24 HOURS
Status: DISCONTINUED | OUTPATIENT
Start: 2025-09-03 | End: 2025-09-06 | Stop reason: HOSPADM

## 2025-09-03 RX ORDER — HYDROXYZINE HYDROCHLORIDE 10 MG/1
10 TABLET, FILM COATED ORAL NIGHTLY
Status: DISCONTINUED | OUTPATIENT
Start: 2025-09-03 | End: 2025-09-06 | Stop reason: HOSPADM

## 2025-09-03 RX ADMIN — SENNOSIDES AND DOCUSATE SODIUM 1 TABLET: 50; 8.6 TABLET ORAL at 21:20

## 2025-09-03 RX ADMIN — SODIUM CHLORIDE, SODIUM LACTATE, POTASSIUM CHLORIDE, AND CALCIUM CHLORIDE 100 ML/HR: .6; .31; .03; .02 INJECTION, SOLUTION INTRAVENOUS at 14:43

## 2025-09-03 RX ADMIN — KETOROLAC TROMETHAMINE 15 MG: 30 INJECTION, SOLUTION INTRAMUSCULAR at 14:43

## 2025-09-03 RX ADMIN — ACETAMINOPHEN 975 MG: 325 TABLET ORAL at 21:20

## 2025-09-03 RX ADMIN — ENOXAPARIN SODIUM 40 MG: 100 INJECTION SUBCUTANEOUS at 14:43

## 2025-09-03 RX ADMIN — ACETAMINOPHEN 975 MG: 325 TABLET ORAL at 14:43

## 2025-09-03 SDOH — SOCIAL STABILITY: SOCIAL INSECURITY: ABUSE: ADULT

## 2025-09-03 SDOH — SOCIAL STABILITY: SOCIAL INSECURITY: WITHIN THE LAST YEAR, HAVE YOU BEEN AFRAID OF YOUR PARTNER OR EX-PARTNER?: NO

## 2025-09-03 SDOH — ECONOMIC STABILITY: HOUSING INSECURITY: IN THE LAST 12 MONTHS, WAS THERE A TIME WHEN YOU WERE NOT ABLE TO PAY THE MORTGAGE OR RENT ON TIME?: NO

## 2025-09-03 SDOH — ECONOMIC STABILITY: FOOD INSECURITY: HOW HARD IS IT FOR YOU TO PAY FOR THE VERY BASICS LIKE FOOD, HOUSING, MEDICAL CARE, AND HEATING?: NOT VERY HARD

## 2025-09-03 SDOH — ECONOMIC STABILITY: INCOME INSECURITY: IN THE PAST 12 MONTHS HAS THE ELECTRIC, GAS, OIL, OR WATER COMPANY THREATENED TO SHUT OFF SERVICES IN YOUR HOME?: NO

## 2025-09-03 SDOH — SOCIAL STABILITY: SOCIAL INSECURITY: ARE YOU OR HAVE YOU BEEN THREATENED OR ABUSED PHYSICALLY, EMOTIONALLY, OR SEXUALLY BY ANYONE?: NO

## 2025-09-03 SDOH — ECONOMIC STABILITY: HOUSING INSECURITY: AT ANY TIME IN THE PAST 12 MONTHS, WERE YOU HOMELESS OR LIVING IN A SHELTER (INCLUDING NOW)?: NO

## 2025-09-03 SDOH — SOCIAL STABILITY: SOCIAL INSECURITY: WITHIN THE LAST YEAR, HAVE YOU BEEN HUMILIATED OR EMOTIONALLY ABUSED IN OTHER WAYS BY YOUR PARTNER OR EX-PARTNER?: NO

## 2025-09-03 SDOH — ECONOMIC STABILITY: HOUSING INSECURITY: IN THE PAST 12 MONTHS, HOW MANY TIMES HAVE YOU MOVED WHERE YOU WERE LIVING?: 1

## 2025-09-03 SDOH — SOCIAL STABILITY: SOCIAL INSECURITY: DO YOU FEEL UNSAFE GOING BACK TO THE PLACE WHERE YOU ARE LIVING?: NO

## 2025-09-03 SDOH — ECONOMIC STABILITY: FOOD INSECURITY: WITHIN THE PAST 12 MONTHS, THE FOOD YOU BOUGHT JUST DIDN'T LAST AND YOU DIDN'T HAVE MONEY TO GET MORE.: NEVER TRUE

## 2025-09-03 SDOH — SOCIAL STABILITY: SOCIAL INSECURITY: HAVE YOU HAD THOUGHTS OF HARMING ANYONE ELSE?: NO

## 2025-09-03 SDOH — ECONOMIC STABILITY: FOOD INSECURITY: WITHIN THE PAST 12 MONTHS, YOU WORRIED THAT YOUR FOOD WOULD RUN OUT BEFORE YOU GOT THE MONEY TO BUY MORE.: NEVER TRUE

## 2025-09-03 SDOH — SOCIAL STABILITY: SOCIAL INSECURITY: HAS ANYONE EVER THREATENED TO HURT YOUR FAMILY OR YOUR PETS?: NO

## 2025-09-03 SDOH — SOCIAL STABILITY: SOCIAL INSECURITY: DOES ANYONE TRY TO KEEP YOU FROM HAVING/CONTACTING OTHER FRIENDS OR DOING THINGS OUTSIDE YOUR HOME?: NO

## 2025-09-03 SDOH — SOCIAL STABILITY: SOCIAL INSECURITY: DO YOU FEEL ANYONE HAS EXPLOITED OR TAKEN ADVANTAGE OF YOU FINANCIALLY OR OF YOUR PERSONAL PROPERTY?: NO

## 2025-09-03 SDOH — ECONOMIC STABILITY: TRANSPORTATION INSECURITY: IN THE PAST 12 MONTHS, HAS LACK OF TRANSPORTATION KEPT YOU FROM MEDICAL APPOINTMENTS OR FROM GETTING MEDICATIONS?: NO

## 2025-09-03 SDOH — SOCIAL STABILITY: SOCIAL INSECURITY: ARE THERE ANY APPARENT SIGNS OF INJURIES/BEHAVIORS THAT COULD BE RELATED TO ABUSE/NEGLECT?: NO

## 2025-09-03 SDOH — SOCIAL STABILITY: SOCIAL INSECURITY: WERE YOU ABLE TO COMPLETE ALL THE BEHAVIORAL HEALTH SCREENINGS?: YES

## 2025-09-03 SDOH — SOCIAL STABILITY: SOCIAL INSECURITY: HAVE YOU HAD ANY THOUGHTS OF HARMING ANYONE ELSE?: NO

## 2025-09-03 ASSESSMENT — COGNITIVE AND FUNCTIONAL STATUS - GENERAL
MOBILITY SCORE: 16
MOVING FROM LYING ON BACK TO SITTING ON SIDE OF FLAT BED WITH BEDRAILS: A LITTLE
DAILY ACTIVITIY SCORE: 19
TURNING FROM BACK TO SIDE WHILE IN FLAT BAD: A LITTLE
STANDING UP FROM CHAIR USING ARMS: A LITTLE
WALKING IN HOSPITAL ROOM: A LOT
MOVING TO AND FROM BED TO CHAIR: A LITTLE
PATIENT BASELINE BEDBOUND: NO
CLIMB 3 TO 5 STEPS WITH RAILING: A LOT
TOILETING: A LOT
DRESSING REGULAR LOWER BODY CLOTHING: A LOT
HELP NEEDED FOR BATHING: A LITTLE

## 2025-09-03 ASSESSMENT — LIFESTYLE VARIABLES
EVER HAD A DRINK FIRST THING IN THE MORNING TO STEADY YOUR NERVES TO GET RID OF A HANGOVER: NO
HAVE YOU EVER FELT YOU SHOULD CUT DOWN ON YOUR DRINKING: NO
HOW MANY STANDARD DRINKS CONTAINING ALCOHOL DO YOU HAVE ON A TYPICAL DAY: PATIENT DOES NOT DRINK
EVER FELT BAD OR GUILTY ABOUT YOUR DRINKING: NO
HOW OFTEN DO YOU HAVE 6 OR MORE DRINKS ON ONE OCCASION: NEVER
AUDIT-C TOTAL SCORE: 0
SKIP TO QUESTIONS 9-10: 1
HAVE PEOPLE ANNOYED YOU BY CRITICIZING YOUR DRINKING: NO
AUDIT-C TOTAL SCORE: 0
HOW OFTEN DO YOU HAVE A DRINK CONTAINING ALCOHOL: NEVER
TOTAL SCORE: 0

## 2025-09-03 ASSESSMENT — PAIN - FUNCTIONAL ASSESSMENT
PAIN_FUNCTIONAL_ASSESSMENT: 0-10

## 2025-09-03 ASSESSMENT — ACTIVITIES OF DAILY LIVING (ADL)
GROOMING: INDEPENDENT
FEEDING YOURSELF: INDEPENDENT
LACK_OF_TRANSPORTATION: NO
TOILETING: NEEDS ASSISTANCE
HEARING - LEFT EAR: FUNCTIONAL
HEARING - RIGHT EAR: FUNCTIONAL
ADEQUATE_TO_COMPLETE_ADL: YES
JUDGMENT_ADEQUATE_SAFELY_COMPLETE_DAILY_ACTIVITIES: YES
LACK_OF_TRANSPORTATION: NO
WALKS IN HOME: NEEDS ASSISTANCE
DRESSING YOURSELF: INDEPENDENT
PATIENT'S MEMORY ADEQUATE TO SAFELY COMPLETE DAILY ACTIVITIES?: YES
BATHING: NEEDS ASSISTANCE

## 2025-09-03 ASSESSMENT — PAIN DESCRIPTION - FREQUENCY
FREQUENCY: CONSTANT/CONTINUOUS
FREQUENCY: CONSTANT/CONTINUOUS

## 2025-09-03 ASSESSMENT — PAIN SCALES - GENERAL
PAINLEVEL_OUTOF10: 5 - MODERATE PAIN
PAINLEVEL_OUTOF10: 10 - WORST POSSIBLE PAIN
PAINLEVEL_OUTOF10: 4

## 2025-09-03 ASSESSMENT — PAIN DESCRIPTION - DESCRIPTORS: DESCRIPTORS: ACHING

## 2025-09-03 ASSESSMENT — PATIENT HEALTH QUESTIONNAIRE - PHQ9
SUM OF ALL RESPONSES TO PHQ9 QUESTIONS 1 & 2: 0
1. LITTLE INTEREST OR PLEASURE IN DOING THINGS: NOT AT ALL
2. FEELING DOWN, DEPRESSED OR HOPELESS: NOT AT ALL

## 2025-09-03 ASSESSMENT — PAIN DESCRIPTION - ONSET: ONSET: ONGOING

## 2025-09-03 ASSESSMENT — PAIN DESCRIPTION - ORIENTATION
ORIENTATION: RIGHT;LEFT
ORIENTATION: LEFT;RIGHT

## 2025-09-03 ASSESSMENT — PAIN DESCRIPTION - LOCATION
LOCATION: LEG
LOCATION: LEG

## 2025-09-03 ASSESSMENT — PAIN DESCRIPTION - PROGRESSION: CLINICAL_PROGRESSION: GRADUALLY IMPROVING

## 2025-09-03 ASSESSMENT — PAIN DESCRIPTION - PAIN TYPE
TYPE: ACUTE PAIN
TYPE: ACUTE PAIN

## 2025-09-04 PROCEDURE — 97166 OT EVAL MOD COMPLEX 45 MIN: CPT | Mod: GO

## 2025-09-04 PROCEDURE — 2500000005 HC RX 250 GENERAL PHARMACY W/O HCPCS

## 2025-09-04 PROCEDURE — 97161 PT EVAL LOW COMPLEX 20 MIN: CPT | Mod: GP

## 2025-09-04 PROCEDURE — 1200000002 HC GENERAL ROOM WITH TELEMETRY DAILY

## 2025-09-04 PROCEDURE — 2500000002 HC RX 250 W HCPCS SELF ADMINISTERED DRUGS (ALT 637 FOR MEDICARE OP, ALT 636 FOR OP/ED)

## 2025-09-04 PROCEDURE — 99232 SBSQ HOSP IP/OBS MODERATE 35: CPT

## 2025-09-04 PROCEDURE — 2500000001 HC RX 250 WO HCPCS SELF ADMINISTERED DRUGS (ALT 637 FOR MEDICARE OP)

## 2025-09-04 PROCEDURE — 2500000004 HC RX 250 GENERAL PHARMACY W/ HCPCS (ALT 636 FOR OP/ED)

## 2025-09-04 RX ORDER — TRAMADOL HYDROCHLORIDE 50 MG/1
50 TABLET, FILM COATED ORAL EVERY 6 HOURS PRN
Status: DISCONTINUED | OUTPATIENT
Start: 2025-09-04 | End: 2025-09-06 | Stop reason: HOSPADM

## 2025-09-04 RX ORDER — TRAMADOL HYDROCHLORIDE 50 MG/1
25 TABLET, FILM COATED ORAL EVERY 6 HOURS PRN
Status: DISCONTINUED | OUTPATIENT
Start: 2025-09-04 | End: 2025-09-04

## 2025-09-04 RX ADMIN — SENNOSIDES AND DOCUSATE SODIUM 1 TABLET: 50; 8.6 TABLET ORAL at 21:01

## 2025-09-04 RX ADMIN — TRAMADOL HYDROCHLORIDE 50 MG: 50 TABLET, COATED ORAL at 14:57

## 2025-09-04 RX ADMIN — SERTRALINE HYDROCHLORIDE 150 MG: 100 TABLET ORAL at 08:52

## 2025-09-04 RX ADMIN — ACETAMINOPHEN 975 MG: 325 TABLET ORAL at 08:52

## 2025-09-04 RX ADMIN — TRAMADOL HYDROCHLORIDE 50 MG: 50 TABLET, COATED ORAL at 23:31

## 2025-09-04 RX ADMIN — ACETAMINOPHEN 975 MG: 325 TABLET ORAL at 13:50

## 2025-09-04 RX ADMIN — POLYVINYL ALCOHOL, POVIDONE 1 DROP: 14; 6 SOLUTION/ DROPS OPHTHALMIC at 14:58

## 2025-09-04 RX ADMIN — HYDROMORPHONE HYDROCHLORIDE 1 MG: 2 TABLET ORAL at 02:24

## 2025-09-04 RX ADMIN — SODIUM CHLORIDE, SODIUM LACTATE, POTASSIUM CHLORIDE, AND CALCIUM CHLORIDE 100 ML/HR: .6; .31; .03; .02 INJECTION, SOLUTION INTRAVENOUS at 08:52

## 2025-09-04 RX ADMIN — ENOXAPARIN SODIUM 40 MG: 100 INJECTION SUBCUTANEOUS at 13:50

## 2025-09-04 RX ADMIN — MELOXICAM 15 MG: 15 TABLET ORAL at 08:52

## 2025-09-04 RX ADMIN — ACETAMINOPHEN 975 MG: 325 TABLET ORAL at 21:01

## 2025-09-04 ASSESSMENT — COGNITIVE AND FUNCTIONAL STATUS - GENERAL
MOVING TO AND FROM BED TO CHAIR: A LOT
DRESSING REGULAR LOWER BODY CLOTHING: A LOT
CLIMB 3 TO 5 STEPS WITH RAILING: A LOT
MOBILITY SCORE: 16
MOVING FROM LYING ON BACK TO SITTING ON SIDE OF FLAT BED WITH BEDRAILS: A LITTLE
DRESSING REGULAR UPPER BODY CLOTHING: A LITTLE
TOILETING: TOTAL
MOVING TO AND FROM BED TO CHAIR: A LITTLE
DRESSING REGULAR LOWER BODY CLOTHING: TOTAL
TURNING FROM BACK TO SIDE WHILE IN FLAT BAD: A LITTLE
HELP NEEDED FOR BATHING: A LITTLE
WALKING IN HOSPITAL ROOM: A LOT
DAILY ACTIVITIY SCORE: 15
CLIMB 3 TO 5 STEPS WITH RAILING: TOTAL
TOILETING: A LOT
HELP NEEDED FOR BATHING: A LOT
DAILY ACTIVITIY SCORE: 19
MOBILITY SCORE: 13
STANDING UP FROM CHAIR USING ARMS: A LITTLE
MOVING FROM LYING ON BACK TO SITTING ON SIDE OF FLAT BED WITH BEDRAILS: A LITTLE
STANDING UP FROM CHAIR USING ARMS: A LOT
TURNING FROM BACK TO SIDE WHILE IN FLAT BAD: A LOT
WALKING IN HOSPITAL ROOM: A LITTLE

## 2025-09-04 ASSESSMENT — PAIN SCALES - GENERAL
PAINLEVEL_OUTOF10: 4
PAINLEVEL_OUTOF10: 0 - NO PAIN
PAINLEVEL_OUTOF10: 5 - MODERATE PAIN
PAINLEVEL_OUTOF10: 0 - NO PAIN

## 2025-09-04 ASSESSMENT — PAIN DESCRIPTION - ORIENTATION: ORIENTATION: LEFT;RIGHT

## 2025-09-04 ASSESSMENT — PAIN - FUNCTIONAL ASSESSMENT
PAIN_FUNCTIONAL_ASSESSMENT: 0-10

## 2025-09-04 ASSESSMENT — PAIN DESCRIPTION - LOCATION: LOCATION: LEG

## 2025-09-05 PROBLEM — E86.0 DEHYDRATION: Status: RESOLVED | Noted: 2025-09-03 | Resolved: 2025-09-05

## 2025-09-05 PROCEDURE — 2500000001 HC RX 250 WO HCPCS SELF ADMINISTERED DRUGS (ALT 637 FOR MEDICARE OP)

## 2025-09-05 PROCEDURE — 2500000004 HC RX 250 GENERAL PHARMACY W/ HCPCS (ALT 636 FOR OP/ED)

## 2025-09-05 PROCEDURE — 1200000002 HC GENERAL ROOM WITH TELEMETRY DAILY

## 2025-09-05 PROCEDURE — 99231 SBSQ HOSP IP/OBS SF/LOW 25: CPT

## 2025-09-05 PROCEDURE — 2500000002 HC RX 250 W HCPCS SELF ADMINISTERED DRUGS (ALT 637 FOR MEDICARE OP, ALT 636 FOR OP/ED)

## 2025-09-05 RX ADMIN — MELOXICAM 15 MG: 15 TABLET ORAL at 08:22

## 2025-09-05 RX ADMIN — ACETAMINOPHEN 975 MG: 325 TABLET ORAL at 08:22

## 2025-09-05 RX ADMIN — ACETAMINOPHEN 975 MG: 325 TABLET ORAL at 20:20

## 2025-09-05 RX ADMIN — TRAMADOL HYDROCHLORIDE 50 MG: 50 TABLET, COATED ORAL at 08:22

## 2025-09-05 RX ADMIN — ENOXAPARIN SODIUM 40 MG: 100 INJECTION SUBCUTANEOUS at 15:22

## 2025-09-05 RX ADMIN — SENNOSIDES AND DOCUSATE SODIUM 1 TABLET: 50; 8.6 TABLET ORAL at 20:20

## 2025-09-05 RX ADMIN — SERTRALINE HYDROCHLORIDE 150 MG: 100 TABLET ORAL at 08:22

## 2025-09-05 RX ADMIN — HYDROMORPHONE HYDROCHLORIDE 0.2 MG: 0.2 INJECTION, SOLUTION INTRAMUSCULAR; INTRAVENOUS; SUBCUTANEOUS at 23:03

## 2025-09-05 RX ADMIN — HYDROXYZINE HYDROCHLORIDE 10 MG: 10 TABLET ORAL at 20:20

## 2025-09-05 RX ADMIN — TRAMADOL HYDROCHLORIDE 50 MG: 50 TABLET, COATED ORAL at 20:20

## 2025-09-05 RX ADMIN — ACETAMINOPHEN 975 MG: 325 TABLET ORAL at 15:22

## 2025-09-05 ASSESSMENT — PAIN SCALES - GENERAL
PAINLEVEL_OUTOF10: 5 - MODERATE PAIN
PAINLEVEL_OUTOF10: 4
PAINLEVEL_OUTOF10: 5 - MODERATE PAIN
PAINLEVEL_OUTOF10: 3
PAINLEVEL_OUTOF10: 7
PAINLEVEL_OUTOF10: 6

## 2025-09-05 ASSESSMENT — COGNITIVE AND FUNCTIONAL STATUS - GENERAL
MOVING FROM LYING ON BACK TO SITTING ON SIDE OF FLAT BED WITH BEDRAILS: A LITTLE
DAILY ACTIVITIY SCORE: 19
DRESSING REGULAR LOWER BODY CLOTHING: A LOT
TURNING FROM BACK TO SIDE WHILE IN FLAT BAD: A LITTLE
CLIMB 3 TO 5 STEPS WITH RAILING: A LOT
MOVING TO AND FROM BED TO CHAIR: A LITTLE
WALKING IN HOSPITAL ROOM: A LOT
TOILETING: A LOT
STANDING UP FROM CHAIR USING ARMS: A LITTLE
MOBILITY SCORE: 16
HELP NEEDED FOR BATHING: A LITTLE

## 2025-09-05 ASSESSMENT — PAIN DESCRIPTION - ORIENTATION
ORIENTATION: RIGHT;LEFT
ORIENTATION: RIGHT;LEFT

## 2025-09-05 ASSESSMENT — PAIN DESCRIPTION - LOCATION
LOCATION: LEG
LOCATION: LEG

## 2025-09-05 ASSESSMENT — PAIN - FUNCTIONAL ASSESSMENT
PAIN_FUNCTIONAL_ASSESSMENT: 0-10

## 2025-09-06 ENCOUNTER — DOCUMENTATION (OUTPATIENT)
Dept: HOME HEALTH SERVICES | Facility: HOME HEALTH | Age: 80
End: 2025-09-06
Payer: MEDICARE

## 2025-09-06 VITALS
OXYGEN SATURATION: 94 % | TEMPERATURE: 97 F | HEIGHT: 61 IN | BODY MASS INDEX: 27.75 KG/M2 | RESPIRATION RATE: 15 BRPM | WEIGHT: 147 LBS | DIASTOLIC BLOOD PRESSURE: 57 MMHG | SYSTOLIC BLOOD PRESSURE: 100 MMHG | HEART RATE: 91 BPM

## 2025-09-06 PROCEDURE — 2500000002 HC RX 250 W HCPCS SELF ADMINISTERED DRUGS (ALT 637 FOR MEDICARE OP, ALT 636 FOR OP/ED)

## 2025-09-06 PROCEDURE — 2500000001 HC RX 250 WO HCPCS SELF ADMINISTERED DRUGS (ALT 637 FOR MEDICARE OP)

## 2025-09-06 PROCEDURE — 2500000004 HC RX 250 GENERAL PHARMACY W/ HCPCS (ALT 636 FOR OP/ED)

## 2025-09-06 PROCEDURE — 99238 HOSP IP/OBS DSCHRG MGMT 30/<: CPT

## 2025-09-06 RX ADMIN — ACETAMINOPHEN 975 MG: 325 TABLET ORAL at 10:40

## 2025-09-06 RX ADMIN — SENNOSIDES AND DOCUSATE SODIUM 1 TABLET: 50; 8.6 TABLET ORAL at 10:41

## 2025-09-06 RX ADMIN — SERTRALINE HYDROCHLORIDE 150 MG: 100 TABLET ORAL at 10:40

## 2025-09-06 RX ADMIN — MELOXICAM 15 MG: 15 TABLET ORAL at 10:41

## 2025-09-06 RX ADMIN — ACETAMINOPHEN 975 MG: 325 TABLET ORAL at 16:00

## 2025-09-06 RX ADMIN — TRAMADOL HYDROCHLORIDE 50 MG: 50 TABLET, COATED ORAL at 16:00

## 2025-09-06 RX ADMIN — ENOXAPARIN SODIUM 40 MG: 100 INJECTION SUBCUTANEOUS at 16:00

## 2025-09-06 ASSESSMENT — COGNITIVE AND FUNCTIONAL STATUS - GENERAL
TOILETING: A LOT
HELP NEEDED FOR BATHING: A LITTLE
CLIMB 3 TO 5 STEPS WITH RAILING: A LOT
MOVING FROM LYING ON BACK TO SITTING ON SIDE OF FLAT BED WITH BEDRAILS: A LITTLE
MOVING TO AND FROM BED TO CHAIR: A LITTLE
WALKING IN HOSPITAL ROOM: A LOT
DAILY ACTIVITIY SCORE: 19
STANDING UP FROM CHAIR USING ARMS: A LITTLE
DRESSING REGULAR LOWER BODY CLOTHING: A LOT
MOBILITY SCORE: 16
TURNING FROM BACK TO SIDE WHILE IN FLAT BAD: A LITTLE

## 2025-09-06 ASSESSMENT — PAIN SCALES - GENERAL
PAINLEVEL_OUTOF10: 4
PAINLEVEL_OUTOF10: 0 - NO PAIN

## 2025-09-06 ASSESSMENT — PAIN - FUNCTIONAL ASSESSMENT
PAIN_FUNCTIONAL_ASSESSMENT: 0-10
PAIN_FUNCTIONAL_ASSESSMENT: 0-10